# Patient Record
Sex: FEMALE | Race: WHITE | NOT HISPANIC OR LATINO | Employment: FULL TIME | URBAN - METROPOLITAN AREA
[De-identification: names, ages, dates, MRNs, and addresses within clinical notes are randomized per-mention and may not be internally consistent; named-entity substitution may affect disease eponyms.]

---

## 2017-08-24 ENCOUNTER — OFFICE VISIT (OUTPATIENT)
Dept: FAMILY MEDICINE | Facility: CLINIC | Age: 52
End: 2017-08-24
Payer: COMMERCIAL

## 2017-08-24 VITALS
WEIGHT: 259 LBS | DIASTOLIC BLOOD PRESSURE: 84 MMHG | HEIGHT: 66 IN | TEMPERATURE: 98.1 F | SYSTOLIC BLOOD PRESSURE: 134 MMHG | BODY MASS INDEX: 41.62 KG/M2 | HEART RATE: 76 BPM

## 2017-08-24 DIAGNOSIS — M46.1 INFLAMMATION OF RIGHT SACROILIAC JOINT (H): ICD-10-CM

## 2017-08-24 DIAGNOSIS — S76.311A RIGHT HAMSTRING MUSCLE STRAIN, INITIAL ENCOUNTER: Primary | ICD-10-CM

## 2017-08-24 PROCEDURE — 99213 OFFICE O/P EST LOW 20 MIN: CPT | Performed by: NURSE PRACTITIONER

## 2017-08-24 NOTE — MR AVS SNAPSHOT
After Visit Summary   8/24/2017    Lamar Zimmerman    MRN: [id number]           Patient Information     Date Of Birth          1965        Visit Information        Provider Department      8/24/2017 4:00 PM Mahogany Cedillo APRN [facility]        Today's Diagnoses     Right hamstring muscle strain, initial encounter    -  1    Inflammation of right sacroiliac joint (H)          Care Instructions    See ELLIS for exercises,      massage in Asper cream  2-4 times per day     Get the Advil gel caps and take  2-3  Capsules  3 times per day for the next  7-10 days.     Do the stretches that feel good.               Follow-ups after your visit        Additional Services     [facility] PT, HAND, AND CHIROPRACTIC REFERRAL       **This order will print in the [facility] Scheduling Office**    Physical Therapy, Hand Therapy and Chiropractic Care are available through:    *[facility]  *[facility]  *[facility]    Call one number to schedule at any of the above locations: [phone].    Your provider has referred you to: Physical Therapy at [facility] or [facility]    Indication/Reason for Referral: rigtht   Onset of Illness: several weeks ago    Therapy Orders: Evaluate and Treat  Special Programs: None  Special Request: None    Juan [facility]      Additional Comments for the Therapist or Chiropractor:     Please be aware that coverage of these services is subject to the terms and limitations of your health insurance plan.  Call member services at your health plan with any benefit or coverage questions.      Please bring the following to your appointment:    *Your personal calendar for scheduling future appointments  *Comfortable clothing                  Who to contact     Normal or non-critical lab and imaging results will be communicated to you by MyChart, letter or phone within 4 business days after the clinic has received the results. If you do  "not hear from us within 7 days, please contact the clinic through Koogame or phone. If you have a critical or abnormal lab result, we will notify you by phone as soon as possible.  Submit refill requests through Koogame or call your pharmacy and they will forward the refill request to us. Please allow 3 business days for your refill to be completed.          If you need to speak with a  for additional information , please call: [phone]           Additional Information About Your Visit        Koogame Information     Koogame gives you secure access to your electronic health record. If you see a primary care provider, you can also send messages to your care team and make appointments. If you have questions, please call your primary care clinic.  If you do not have a primary care provider, please call [phone] and they will assist you.        Care EveryWhere ID     This is your Care EveryWhere ID. This could be used by other organizations to access your [facility] medical records  [id number]        Your Vitals Were     Pulse Temperature Height BMI (Body Mass Index)          76 98.1  F (36.7  C) (Oral) 5' 5.5\" (1.664 m) 42.44 kg/m2         Blood Pressure from Last 3 Encounters:   08/24/17 134/84   01/26/16 128/76   12/28/15 134/72    Weight from Last 3 Encounters:   08/24/17 259 lb (117.5 kg)   01/26/16 244 lb (110.7 kg)   12/28/15 249 lb 12.8 oz (113.3 kg)              We Performed the Following     [facility] PT, HAND, AND CHIROPRACTIC REFERRAL          Today's Medication Changes          These changes are accurate as of: 8/24/17  4:55 PM.  If you have any questions, ask your nurse or doctor.               Stop taking these medicines if you haven't already. Please contact your care team if you have questions.     cefPROZIL 500 MG tablet   Commonly known as:  CEFZIL   Stopped by:  Mahogany Cedillo APRN CNP                    Primary Care Provider Office Phone # Fax #    Mahogany Cedillo, " APRN -[phone]       [address]   [place] [address]        Equal Access to Services     JEREMY PATEL : Hadii aad ku hadlawotf Caprilani, waivánda tamarajavon, nicole karodriguezda sheldon, jasmina fredyin hayaan arianabarbra magallon laKatieshannan aba. So [facility] [phone].    ATENCIÓN: Si habla español, tiene a field disposición servicios gratuitos de asistencia lingüística. Llame al [phone].    We comply with applicable federal civil rights laws and [place] laws. We do not discriminate on the basis of race, color, national origin, age, disability sex, sexual orientation or gender identity.            Thank you!     Thank you for choosing [facility] [place]  for your care. Our goal is always to provide you with excellent care. Hearing back from our patients is one way we can continue to improve our services. Please take a few minutes to complete the written survey that you may receive in the mail after your visit with us. Thank you!             Your Updated Medication List - Protect others around you: Learn how to safely use, store and throw away your medicines at www.disposemymeds.org.          This list is accurate as of: 8/24/17  4:55 PM.  Always use your most recent med list.                   Brand Name Dispense Instructions for use Diagnosis    NO ACTIVE MEDICATIONS      .

## 2017-08-24 NOTE — PATIENT INSTRUCTIONS
See ELLIS for exercises,      massage in Asper cream  2-4 times per day     Get the Advil gel caps and take  2-3  Capsules  3 times per day for the next  7-10 days.     Do the stretches that feel good.

## 2017-08-24 NOTE — NURSING NOTE
"Chief Complaint   Patient presents with     Musculoskeletal Problem       Initial /84 (BP Location: Right arm, Patient Position: Chair, Cuff Size: Adult Large)  Pulse 76  Temp 98.1  F (36.7  C) (Oral)  Ht 5' 5.5\" (1.664 m)  Wt 259 lb (117.5 kg)  BMI 42.44 kg/m2 Estimated body mass index is 42.44 kg/(m^2) as calculated from the following:    Height as of this encounter: 5' 5.5\" (1.664 m).    Weight as of this encounter: 259 lb (117.5 kg).  Medication Reconciliation: complete     Bronwyn Shaw CMA (AAMA)      "

## 2017-08-24 NOTE — PROGRESS NOTES
SUBJECTIVE:   Lamar Zimmreman is a 51 year old female who presents to clinic today for the following health issues:      Hip Pain    Onset: 2 months    Description:   Location: Right side  Character: Nagging, aching pain    Intensity: moderate    Progression of Symptoms: worse    Accompanying Signs & Symptoms:  Other symptoms: radiation of pain to lower right leg    History:   Previous similar pain: no       Precipitating factors:   Trauma or overuse: YES- states that it started after riding bike a lot at the gym and got worse after long car rides over the summer. Sitting for long periods really exacerbates the pain.    Alleviating factors:  Improved by: NSAID - ibuprofen    Therapies Tried and outcome: Ibuprofen - does help to alleviate the pain          Joined ad gym with a co-worker and they were going great .   Started in  January - going to the gym 3-5 times per week. They were riding the bike and then weights.  Then the bikes.   2 months ago started to have right  Hip pain . Hurts to sit.  Need to change position frequently   Sleeping on the right hip is OK .  And she is able to  Get up.   She will have tingling going down the right outer thigh to the calf.    Has been trying to keep exercising but has been adjusting her workout.   The pain starts  At the top of the thigh and lower buttock .    Stretching is feeling good.    Ibuprofen will help during the day but has to stand /walk most of the time       Problem list and histories reviewed & adjusted, as indicated.  Additional history: as documented    Patient Active Problem List   Diagnosis     Overweight     CARDIOVASCULAR SCREENING; LDL GOAL LESS THAN 160     Radha-menopause     Fatigue     Weight gain     24 hour contact handout given     Vitamin D deficiency     Contact dermatitis     GERD (gastroesophageal reflux disease)     Elevated blood pressure reading without diagnosis of hypertension     Malaise and fatigue     Past Surgical History:   Procedure  "Laterality Date     C APPENDECTOMY  3/1991     C  DELIVERY ONLY      , Low Cervical     SURGICAL HISTORY OF -       Laparoscopy-Ovarian Cyst       Social History   Substance Use Topics     Smoking status: Former Smoker     Smokeless tobacco: Never Used      Comment: quit      Alcohol use Yes      Comment: on occasion     Family History   Problem Relation Age of Onset     Hypertension Mother      DIABETES Maternal Grandmother      CEREBROVASCULAR DISEASE Paternal Grandmother      C.A.D. Paternal Grandfather      DIABETES Paternal Grandfather      Breast Cancer Other      cousin         Current Outpatient Prescriptions   Medication Sig Dispense Refill     NO ACTIVE MEDICATIONS .       Allergies   Allergen Reactions     Erythromycin Rash     Penicillin [Esters] Rash     Sulfa Drugs Rash     Voltaren [Nsaids] Rash     Nickel Rash     BP Readings from Last 3 Encounters:   17 134/84   16 128/76   12/28/15 134/72    Wt Readings from Last 3 Encounters:   17 259 lb (117.5 kg)   16 244 lb (110.7 kg)   12/28/15 249 lb 12.8 oz (113.3 kg)                        Reviewed and updated as needed this visit by clinical staff     Reviewed and updated as needed this visit by Provider         ROS:  C: NEGATIVE for fever, chills, POSITIVE change in weight with weight gain   E/M: NEGATIVE for ear, mouth and throat problems  R: NEGATIVE for significant cough or SOB  CV: NEGATIVE for chest pain, palpitations or peripheral edema  MUSCULOSKELETAL: POSITIVE  for right upper thigh /hip pain ,. And right low back pain     OBJECTIVE:     /84 (BP Location: Right arm, Patient Position: Chair, Cuff Size: Adult Large)  Pulse 76  Temp 98.1  F (36.7  C) (Oral)  Ht 5' 5.5\" (1.664 m)  Wt 259 lb (117.5 kg)  BMI 42.44 kg/m2  Body mass index is 42.44 kg/(m^2).   GENERAL: healthy, alert and no distress  RESP: lungs clear to auscultation - no rales, rhonchi or wheezes  CV: regular rate and " "rhythm, normal S1 S2, no S3 or S4, no murmur, click or rub, no peripheral edema and peripheral pulses strong  MS: Tender:  right SI joint,  Right upper hamstring   Non-tender:  lumbar spinous processes, lumbar facet joints, left para lumbar muscles, right para lumbar muscles, left SI joint  Range of Motion:  lumbar flexion  full, pain-free, lumbar extension  full, painful, left lateral lumbar bending  full, painful, right lateral lumbar bending  decreased, painful, left lateral lumbar rotation  full, painful, right lateral lumbar rotation  full, painful  Strength:  able to heel walk, able to toe walk, gastrocsoleus   5/5, hamstrings  5/5, quadriceps  5/5  Special tests:  positive SI joint compression, .  Is very tender over the upper right hamstring    Hip Exam: Hip ROM full, right greater trocanter non-tender, right hamstring       Diagnostic Test Results:  none     ASSESSMENT/PLAN:     ASSESSMENT/PLAN:      ICD-10-CM    1. Right hamstring muscle strain, initial encounter S76.311A [facility] PT, HAND, AND CHIROPRACTIC REFERRAL   2. Inflammation of right sacroiliac joint (H) M46.1 [facility] PT, HAND, AND CHIROPRACTIC REFERRAL       Patient Instructions   See ELLIS for exercises,      massage in Asper cream  2-4 times per day     Get the Advil gel caps and take  2-3  Capsules  3 times per day for the next  7-10 days.     Do the stretches that feel good.                  BMI:   Estimated body mass index is 42.44 kg/(m^2) as calculated from the following:    Height as of this encounter: 5' 5.5\" (1.664 m).    Weight as of this encounter: 259 lb (117.5 kg).   Weight management plan: Discussed healthy diet and exercise guidelines and patient will follow up in 6 months in clinic to re-evaluate.        CONSULTATION/REFERRAL to ELLIS   See Patient Instructions    CORINA LEHMAN NP, APRN [facility]  "

## 2017-08-30 ENCOUNTER — THERAPY VISIT (OUTPATIENT)
Dept: PHYSICAL THERAPY | Facility: CLINIC | Age: 52
End: 2017-08-30
Payer: COMMERCIAL

## 2017-08-30 DIAGNOSIS — M25.551 HIP PAIN, RIGHT: Primary | ICD-10-CM

## 2017-08-30 PROCEDURE — 97110 THERAPEUTIC EXERCISES: CPT | Mod: GP | Performed by: PHYSICAL THERAPIST

## 2017-08-30 PROCEDURE — 97161 PT EVAL LOW COMPLEX 20 MIN: CPT | Mod: GP | Performed by: PHYSICAL THERAPIST

## 2017-08-30 NOTE — PROGRESS NOTES
Subjective:    Patient is a 51 year old female presenting with rehab right hip hpi.   Lamar Zimmerman is a 51 year old female with a right hip condition.  Condition occurred with:  Repetition/overuse.  Condition occurred: at home.  This is a new condition  Lamar reports today with complaitns of R hamstring pain initially which has been getting worse over the past 3 months. She states taht sitting and driving is very difficult. She has pain that goes down to her calf. She takes ibuprofen which helps. She has a lot of pain in the buttock and the posterior part of her leg. She states that stairs are difficult. She reports that 5 mins of walking is very painful but sitting is the worst and most difficult to get comfortable. .    Patient reports pain:  Posterior and lateral.  Radiates to:  Hip.  Pain is described as aching and sharp and is constant and reported as 8/10 and 4/10.  Associated symptoms:  Loss of motion/stiffness and loss of strength. Pain is the same all the time.  Symptoms are exacerbated by standing, walking, sitting, weight bearing, ascending stairs, descending stairs and bending/squatting and relieved by rest (streching).  Since onset symptoms are unchanged.  Special testing: none.  Previous treatment: none.  Improvement with previous treatment: none.  General health as reported by patient is good.  Pertinent medical history includes:  None.  Medical allergies: yes.  Other surgeries include:  Other.  Current medications:  Anti-inflammatory.  Current occupation is   .  Patient is working in normal job without restrictions.  Primary job tasks include:  Prolonged sitting, repetitive tasks and prolonged standing.    Barriers include:  None as reported by the patient.    Red flags:  None as reported by the patient.                        Objective:  HIP:    PROM:   L  R   Flexion wnl wnl   Extension wnl wnl   Abduction wnl wnl   IR wnl wnl   ER wnl wnl painful     Strength:   L R   HIP      Flex 5/5 5/5   Ext 4/5 4-/5   Abd 4/5 4-/5 pain   KNEE     Flex 5/5 5/5   Ext 5/5 5/5     Special tests:   KALA Barr's wnl wnl   Luan wnl wnl   DAT CHRISTIAN         Palpation: very tender along R glute and piriformis muscle    Functional: pain with sitting driving and prolonged acitviity    Gait: wnl upon eval today. Patient reports that she does have increased pain with prolonged walking which occasionally causes her to limp.     Symptoms seem to be related to sciatic type issues due to significant tightness and irritation to piriformis and deeper glute muscles. DOes have some pain along R greater trochanter. Would benefit from STM to deeper glute muscles and glute strengthening.     System    Physical Exam    General     ROS    Assessment/Plan:      Patient is a 51 year old female with right side hip complaints.    Patient has the following significant findings with corresponding treatment plan.                Diagnosis 1:  R hip pain  Pain -  hot/cold therapy, US, manual therapy, self management, education, directional preference exercise and home program  Decreased ROM/flexibility - manual therapy, therapeutic exercise, therapeutic activity and home program  Decreased joint mobility - manual therapy, therapeutic exercise, therapeutic activity and home program  Decreased strength - therapeutic exercise, therapeutic activities and home program  Impaired gait - gait training and home program  Impaired muscle performance - neuro re-education and home program  Decreased function - therapeutic activities and home program    Therapy Evaluation Codes:   1) History comprised of:   Personal factors that impact the plan of care:      Time since onset of symptoms and Work status.    Comorbidity factors that impact the plan of care are:      Pain at night/rest.     Medications impacting care: Anti-inflammatory.  2) Examination of Body Systems comprised of:   Body structures and functions that impact the plan of care:       Hip and Knee.   Activity limitations that impact the plan of care are:      Bending, Driving, Lifting, Reading/Computer work, Running, Sitting, Squatting/kneeling, Stairs, Standing, Walking, Working and Sleeping.  3) Clinical presentation characteristics are:   Stable/Uncomplicated.  4) Decision-Making    Low complexity using standardized patient assessment instrument and/or measureable assessment of functional outcome.  Cumulative Therapy Evaluation is: Low complexity.    Previous and current functional limitations:  (See Goal Flow Sheet for this information)    Short term and Long term goals: (See Goal Flow Sheet for this information)     Communication ability:  Patient appears to be able to clearly communicate and understand verbal and written communication and follow directions correctly.  Treatment Explanation - The following has been discussed with the patient:   RX ordered/plan of care  Anticipated outcomes  Possible risks and side effects  This patient would benefit from PT intervention to resume normal activities.   Rehab potential is good.    Frequency:  1 X week, once daily  Duration:  for 8 weeks  Discharge Plan:  Achieve all LTG.  Independent in home treatment program.  Reach maximal therapeutic benefit.    Please refer to the daily flowsheet for treatment today, total treatment time and time spent performing 1:1 timed codes.

## 2017-09-13 ENCOUNTER — THERAPY VISIT (OUTPATIENT)
Dept: PHYSICAL THERAPY | Facility: CLINIC | Age: 52
End: 2017-09-13
Payer: COMMERCIAL

## 2017-09-13 DIAGNOSIS — M25.551 HIP PAIN, RIGHT: ICD-10-CM

## 2017-09-13 PROCEDURE — 97530 THERAPEUTIC ACTIVITIES: CPT | Mod: GP | Performed by: PHYSICAL THERAPIST

## 2017-09-13 PROCEDURE — 97110 THERAPEUTIC EXERCISES: CPT | Mod: GP | Performed by: PHYSICAL THERAPIST

## 2017-09-20 ENCOUNTER — THERAPY VISIT (OUTPATIENT)
Dept: PHYSICAL THERAPY | Facility: CLINIC | Age: 52
End: 2017-09-20
Payer: COMMERCIAL

## 2017-09-20 DIAGNOSIS — M25.551 HIP PAIN, RIGHT: ICD-10-CM

## 2017-09-20 PROCEDURE — 97110 THERAPEUTIC EXERCISES: CPT | Mod: GP | Performed by: PHYSICAL THERAPIST

## 2017-09-20 PROCEDURE — 97140 MANUAL THERAPY 1/> REGIONS: CPT | Mod: GP | Performed by: PHYSICAL THERAPIST

## 2017-10-03 ENCOUNTER — THERAPY VISIT (OUTPATIENT)
Dept: PHYSICAL THERAPY | Facility: CLINIC | Age: 52
End: 2017-10-03
Payer: COMMERCIAL

## 2017-10-03 DIAGNOSIS — M25.551 HIP PAIN, RIGHT: ICD-10-CM

## 2017-10-03 PROCEDURE — 97140 MANUAL THERAPY 1/> REGIONS: CPT | Mod: GP | Performed by: PHYSICAL THERAPIST

## 2017-10-03 PROCEDURE — 97110 THERAPEUTIC EXERCISES: CPT | Mod: GP | Performed by: PHYSICAL THERAPIST

## 2017-11-07 ENCOUNTER — THERAPY VISIT (OUTPATIENT)
Dept: PHYSICAL THERAPY | Facility: CLINIC | Age: 52
End: 2017-11-07
Payer: COMMERCIAL

## 2017-11-07 DIAGNOSIS — M25.551 HIP PAIN, RIGHT: ICD-10-CM

## 2017-11-07 PROCEDURE — 97110 THERAPEUTIC EXERCISES: CPT | Mod: GP | Performed by: PHYSICAL THERAPIST

## 2017-11-07 PROCEDURE — 97140 MANUAL THERAPY 1/> REGIONS: CPT | Mod: GP | Performed by: PHYSICAL THERAPIST

## 2017-12-05 ENCOUNTER — THERAPY VISIT (OUTPATIENT)
Dept: PHYSICAL THERAPY | Facility: CLINIC | Age: 52
End: 2017-12-05
Payer: COMMERCIAL

## 2017-12-05 DIAGNOSIS — M25.551 HIP PAIN, RIGHT: ICD-10-CM

## 2017-12-05 PROCEDURE — 97110 THERAPEUTIC EXERCISES: CPT | Mod: GP | Performed by: PHYSICAL THERAPIST

## 2017-12-05 PROCEDURE — 97140 MANUAL THERAPY 1/> REGIONS: CPT | Mod: GP | Performed by: PHYSICAL THERAPIST

## 2018-03-02 ENCOUNTER — TELEPHONE (OUTPATIENT)
Dept: FAMILY MEDICINE | Facility: CLINIC | Age: 53
End: 2018-03-02

## 2018-03-24 ENCOUNTER — HEALTH MAINTENANCE LETTER (OUTPATIENT)
Age: 53
End: 2018-03-24

## 2018-07-02 ENCOUNTER — HOSPITAL ENCOUNTER (OUTPATIENT)
Dept: MAMMOGRAPHY | Facility: CLINIC | Age: 53
Discharge: HOME | End: 2018-07-02
Attending: NURSE PRACTITIONER | Admitting: NURSE PRACTITIONER
Payer: COMMERCIAL

## 2018-07-02 DIAGNOSIS — Z12.31 VISIT FOR SCREENING MAMMOGRAM: ICD-10-CM

## 2018-07-02 PROCEDURE — 77067 SCR MAMMO BI INCL CAD: CPT

## 2018-10-04 ENCOUNTER — OFFICE VISIT (OUTPATIENT)
Dept: FAMILY MEDICINE | Facility: CLINIC | Age: 53
End: 2018-10-04
Payer: COMMERCIAL

## 2018-10-04 VITALS
HEIGHT: 66 IN | HEART RATE: 80 BPM | SYSTOLIC BLOOD PRESSURE: 130 MMHG | WEIGHT: 267 LBS | TEMPERATURE: 98.6 F | DIASTOLIC BLOOD PRESSURE: 85 MMHG | BODY MASS INDEX: 42.91 KG/M2

## 2018-10-04 DIAGNOSIS — Z86.59 HISTORY OF ANOREXIA NERVOSA: ICD-10-CM

## 2018-10-04 DIAGNOSIS — R53.83 FATIGUE, UNSPECIFIED TYPE: ICD-10-CM

## 2018-10-04 DIAGNOSIS — R63.5 WEIGHT GAIN: ICD-10-CM

## 2018-10-04 DIAGNOSIS — Z12.11 SPECIAL SCREENING FOR MALIGNANT NEOPLASMS, COLON: ICD-10-CM

## 2018-10-04 DIAGNOSIS — M25.551 HIP PAIN, RIGHT: ICD-10-CM

## 2018-10-04 DIAGNOSIS — Z86.59 HISTORY OF BULIMIA: ICD-10-CM

## 2018-10-04 DIAGNOSIS — L72.9 CYST OF SKIN: ICD-10-CM

## 2018-10-04 DIAGNOSIS — Z23 NEED FOR VACCINATION: ICD-10-CM

## 2018-10-04 DIAGNOSIS — Z00.00 WELL ADULT EXAM: Primary | ICD-10-CM

## 2018-10-04 DIAGNOSIS — E66.01 MORBID OBESITY (H): ICD-10-CM

## 2018-10-04 DIAGNOSIS — K21.00 GASTROESOPHAGEAL REFLUX DISEASE WITH ESOPHAGITIS: ICD-10-CM

## 2018-10-04 PROCEDURE — 90715 TDAP VACCINE 7 YRS/> IM: CPT | Performed by: NURSE PRACTITIONER

## 2018-10-04 PROCEDURE — 90471 IMMUNIZATION ADMIN: CPT | Performed by: NURSE PRACTITIONER

## 2018-10-04 PROCEDURE — 99396 PREV VISIT EST AGE 40-64: CPT | Mod: 25 | Performed by: NURSE PRACTITIONER

## 2018-10-04 PROCEDURE — 99214 OFFICE O/P EST MOD 30 MIN: CPT | Mod: 25 | Performed by: NURSE PRACTITIONER

## 2018-10-04 RX ORDER — CYCLOBENZAPRINE HCL 10 MG
10 TABLET ORAL 2 TIMES DAILY PRN
Qty: 320 TABLET | Refills: 1 | Status: SHIPPED | OUTPATIENT
Start: 2018-10-04 | End: 2021-09-15

## 2018-10-04 RX ORDER — DOXYCYCLINE 100 MG/1
100 CAPSULE ORAL 2 TIMES DAILY
Qty: 20 CAPSULE | Refills: 0 | Status: SHIPPED | OUTPATIENT
Start: 2018-10-04 | End: 2018-10-08

## 2018-10-04 ASSESSMENT — ENCOUNTER SYMPTOMS
NAUSEA: 0
RHINORRHEA: 0
SLEEP DISTURBANCE: 0
CONSTIPATION: 0
POLYPHAGIA: 0
FATIGUE: 0
DIARRHEA: 0
CHEST TIGHTNESS: 0
ABDOMINAL PAIN: 0
DIFFICULTY URINATING: 0
LIGHT-HEADEDNESS: 0
NERVOUS/ANXIOUS: 0
HEADACHES: 0
MYALGIAS: 0
FREQUENCY: 0
ROS GI COMMENTS: HEARTBURN
NUMBNESS: 0
ABDOMINAL DISTENTION: 0
ACTIVITY CHANGE: 0
DIZZINESS: 0
ARTHRALGIAS: 1
SORE THROAT: 0
WHEEZING: 0
DYSPHORIC MOOD: 0
VOMITING: 0
PALPITATIONS: 0
SHORTNESS OF BREATH: 0
UNEXPECTED WEIGHT CHANGE: 1
POLYDIPSIA: 0
COUGH: 0

## 2018-10-04 NOTE — MR AVS SNAPSHOT
After Visit Summary   10/4/2018    Lamar Zimmerman    MRN: [id number]           Patient Information     Date Of Birth          1965        Visit Information        Provider Department      10/4/2018 1:00 PM Miya Dunbar APRN [facility]        Today's Diagnoses     Morbid obesity (H)    -  1    Gastroesophageal reflux disease with esophagitis        Weight gain        Well adult exam        Special screening for malignant neoplasms, colon        Fatigue, unspecified type        Cyst of skin        Hip pain, right          Care Instructions    Start zantac 75 mg orally daily, may increase to 2 75 mg tabs in the AM if needed. Lowest effective dose possible.       Preventive Health Recommendations  Female Ages 50 - 64    Yearly exam: See your health care provider every year in order to  o Review health changes.   o Discuss preventive care.    o Review your medicines if your doctor has prescribed any.      Get a Pap test every three years (unless you have an abnormal result and your provider advises testing more often).    If you get Pap tests with HPV test, you only need to test every 5 years, unless you have an abnormal result.     You do not need a Pap test if your uterus was removed (hysterectomy) and you have not had cancer.    You should be tested each year for STDs (sexually transmitted diseases) if you're at risk.     Have a mammogram every 1 to 2 years.    Have a colonoscopy at age 50, or have a yearly FIT test (stool test). These exams screen for colon cancer.      Have a cholesterol test every 5 years, or more often if advised.    Have a diabetes test (fasting glucose) every three years. If you are at risk for diabetes, you should have this test more often.     If you are at risk for osteoporosis (brittle bone disease), think about having a bone density scan (DEXA).    Shots: Get a flu shot each year. Get a tetanus shot every 10 years.    Nutrition:     Eat at  least 5 servings of fruits and vegetables each day.    Eat whole-grain bread, whole-wheat pasta and brown rice instead of white grains and rice.    Get adequate Calcium and Vitamin D.     Lifestyle    Exercise at least 150 minutes a week (30 minutes a day, 5 days a week). This will help you control your weight and prevent disease.    Limit alcohol to one drink per day.    No smoking.     Wear sunscreen to prevent skin cancer.     See your dentist every six months for an exam and cleaning.    See your eye doctor every 1 to 2 years.            Follow-ups after your visit        Additional Services     GASTROENTEROLOGY ADULT REF PROCEDURE ONLY       Last Lab Result: Creatinine (mg/dL)       Date                     Value                 01/19/2015               0.69             ----------  Body mass index is 43.76 kg/(m^2).     Needed:  No  Language:  English    Patient will be contacted to schedule procedure.     Please be aware that coverage of these services is subject to the terms and limitations of your health insurance plan.  Call member services at your health plan with any benefit or coverage questions.  Any procedures must be performed at a [facility] facility OR coordinated by your clinic's referral office.    Please bring the following with you to your appointment:    (1) Any X-Rays, CTs or MRIs which have been performed.  Contact the facility where they were done to arrange for  prior to your scheduled appointment.    (2) List of current medications   (3) This referral request   (4) Any documents/labs given to you for this referral            [facility] PT, HAND, AND CHIROPRACTIC REFERRAL       Physical Therapy, Hand Therapy and Chiropractic Care are available through:  *[facility]  *Hand Therapy (Occupational Therapy or Physical Therapy)  *[facility]    Call one number to schedule at any of the above locations: [phone].    Physical therapy, Hand  therapy and/or Chiropractic care has been recommended by your physician as an excellent treatment option to reduce pain and help people return to normal activities, including sports.  Therapy and/or chiropractic care services are a great complement or alternative to expensive and invasive surgery, injections, or long-term use of prescription medications. The primary goal is to identify the underlying problem and provide you the tools to manage your condition on your own.     Please be aware that coverage of these services is subject to the terms and limitations of your health insurance plan.  Call member services at your health plan with any benefit or coverage questions.      Please bring the following to your appointment:  *Your personal calendar for scheduling future appointments  *Comfortable clothing                  Follow-up notes from your care team     Return for A Fasting Lab Only Visit.      Future tests that were ordered for you today     Open Future Orders        Priority Expected Expires Ordered    [facility] PT, HAND, AND CHIROPRACTIC REFERRAL Routine  10/4/2019 10/4/2018    CBC with platelets differential Routine  10/4/2019 10/4/2018    Comprehensive metabolic panel Routine  10/4/2019 10/4/2018    Lipid panel reflex to direct LDL Fasting Routine  10/4/2019 10/4/2018    TSH with free T4 reflex Routine  10/4/2019 10/4/2018    Vitamin D Deficiency Routine  10/4/2019 10/4/2018            Who to contact     Normal or non-critical lab and imaging results will be communicated to you by Hubkickhart, letter or phone within 4 business days after the clinic has received the results. If you do not hear from us within 7 days, please contact the clinic through Hubkickhart or phone. If you have a critical or abnormal lab result, we will notify you by phone as soon as possible.  Submit refill requests through [employer] or call your pharmacy and they will forward the refill request to us. Please allow 3 business days for your refill  "to be completed.          If you need to speak with a  for additional information , please call: [phone]           Additional Information About Your Visit        NearbyNowhart Information     [email or web address] gives you secure access to your electronic health record. If you see a primary care provider, you can also send messages to your care team and make appointments. If you have questions, please call your primary care clinic.  If you do not have a primary care provider, please call [phone] and they will assist you.        Care EveryWhere ID     This is your Care EveryWhere ID. This could be used by other organizations to access your [facility] medical records  [id number]        Your Vitals Were     Pulse Temperature Height BMI (Body Mass Index)          80 98.6  F (37  C) (Tympanic) 5' 5.5\" (1.664 m) 43.76 kg/m2         Blood Pressure from Last 3 Encounters:   10/04/18 130/85   08/24/17 134/84   01/26/16 128/76    Weight from Last 3 Encounters:   10/04/18 267 lb (121.1 kg)   08/24/17 259 lb (117.5 kg)   01/26/16 244 lb (110.7 kg)              We Performed the Following     GASTROENTEROLOGY ADULT REF PROCEDURE ONLY     HIV Antigen Antibody Combo          Today's Medication Changes          These changes are accurate as of 10/4/18  1:54 PM.  If you have any questions, ask your nurse or doctor.               Start taking these medicines.        Dose/Directions    cyclobenzaprine 10 MG tablet   Commonly known as:  FLEXERIL   Used for:  Hip pain, right        Dose:  10 mg   Take 1 tablet (10 mg) by mouth 2 times daily as needed for muscle spasms No driving after taking medication for 6 hours.   Quantity:  320 tablet   Refills:  1       doxycycline 100 MG capsule   Commonly known as:  VIBRAMYCIN   Used for:  Cyst of skin        Dose:  100 mg   Take 1 capsule (100 mg) by mouth 2 times daily   Quantity:  20 capsule   Refills:  0            Where to get your medicines      These medications were sent to " [facility] [facility] [address]  [address], [facility] [address]     Phone:  [phone]     cyclobenzaprine 10 MG tablet    doxycycline 100 MG capsule                Primary Care Provider Office Phone # Fax #    [place] Jj Ole Cedillo, APRN [facility] [phone] [address]   [place] [address]        Equal Access to Services     JEREMY PATEL : Hadii aad ku hadasho Soomaali, waaxda luqadaha, qaybta kaalmada adeegyada, waxay idiin hayaan adeeg kharash lawilmer . So [facility] [phone].    ATENCIÓN: Si habla español, tiene a field disposición servicios gratuitos de asistencia lingüística. Demetrisame al [phone].    We comply with applicable federal civil rights laws and [place] laws. We do not discriminate on the basis of race, color, national origin, age, disability, sex, sexual orientation, or gender identity.            Thank you!     Thank you for choosing [facility]  for your care. Our goal is always to provide you with excellent care. Hearing back from our patients is one way we can continue to improve our services. Please take a few minutes to complete the written survey that you may receive in the mail after your visit with us. Thank you!             Your Updated Medication List - Protect others around you: Learn how to safely use, store and throw away your medicines at www.disposemymeds.org.          This list is accurate as of 10/4/18  1:54 PM.  Always use your most recent med list.                   Brand Name Dispense Instructions for use Diagnosis    cyclobenzaprine 10 MG tablet    FLEXERIL    320 tablet    Take 1 tablet (10 mg) by mouth 2 times daily as needed for muscle spasms No driving after taking medication for 6 hours.    Hip pain, right       doxycycline 100 MG capsule    VIBRAMYCIN    20 capsule    Take 1 capsule (100 mg) by mouth 2 times daily    Cyst of skin       NO ACTIVE MEDICATIONS      .

## 2018-10-04 NOTE — PROGRESS NOTES
SUBJECTIVE:   CC: Lamar Zimmerman is an 53 year old woman who presents for preventive health visit.     Healthy Habits:    Do you get at least three servings of calcium containing foods daily (dairy, green leafy vegetables, etc.)? yes    Amount of exercise or daily activities, outside of work: none    Problems taking medications regularly not applicable    Medication side effects: No    Have you had an eye exam in the past two years? yes    Do you see a dentist twice per year? yes    Do you have sleep apnea, excessive snoring or daytime drowsiness?no      Concerned with weight    Today's PHQ-2 Score:   PHQ-2 ( 1999 Pfizer) 10/4/2018 1/26/2016   Q1: Little interest or pleasure in doing things 0 0   Q2: Feeling down, depressed or hopeless 0 0   PHQ-2 Score 0 0       Abuse: Current or Past(Physical, Sexual or Emotional)- No  Do you feel safe in your environment - Yes    Social History   Substance Use Topics     Smoking status: Former Smoker     Smokeless tobacco: Never Used      Comment: quit 1993     Alcohol use Yes      Comment: on occasion     If you drink alcohol do you typically have >3 drinks per day or >7 drinks per week? No                     Reviewed orders with patient.  Reviewed health maintenance and updated orders accordingly -  Current Outpatient Prescriptions   Medication Sig Dispense Refill     cyclobenzaprine (FLEXERIL) 10 MG tablet Take 1 tablet (10 mg) by mouth 2 times daily as needed for muscle spasms No driving after taking medication for 6 hours. 320 tablet 1     doxycycline (VIBRAMYCIN) 100 MG capsule Take 1 capsule (100 mg) by mouth 2 times daily 20 capsule 0     NO ACTIVE MEDICATIONS .       Allergies   Allergen Reactions     Erythromycin Rash     Penicillin [Penicillins] Rash     Sulfa Drugs Rash     Voltaren [Nsaids] Rash     Nickel Rash       Patient over age 50, mutual decision to screen reflected in health maintenance.    Pertinent mammograms are reviewed under the imaging tab.  History  of abnormal Pap smear: NO - age 30-65 PAP every 5 years with negative HPV co-testing recommended  PAP / HPV 1/19/2015 5/1/2012 9/29/2009   PAP NIL NIL NIL     Reviewed and updated as needed this visit by clinical staff  Allergies         Reviewed and updated as needed this visit by Provider            Here today for physical. Father passed away 1 week ago from esophageal cancer.     Continues to have right hip pain. Will have pain all the way down right side into right leg. Pain would move from side to side. Is not able to do any type of repetitive type motion because that will causes pain. Is not able to bike, walk because repeatitive motion hurts it. Has never had a MRI in the past. Pain will be down outside of leg.      Has been gaining weight. Tries to go to the gym three times per week. Is not able to as much because of above. Has struggled with anorexia and bulmia in the past when was in HS. Is concerned that if does food journal would re-trigger paranoid behavior. Would like to lose 50 pounds in 1 year. Feels very tired and labio is down     Has acid reflux daily. Takes zantac after starts to feel the pain. Does drink coffee, and alcohol.  Father with esophageal cancer.  Knows that weight is contributing to reflux.  Knows that diet could be better.    Has area to left armpit.  Reports was a zit in the past and popped it.  Then had some scar tissue left over.  Gradually over the last few weeks has started to become more painful and tender.  Is occasionally draining.  No home over-the-counter treatments.      Last Pap: 1/15 normal, HPV negative. Never an abnormal   Last mammogram: 7/18  Last BMD: Never   Last Colonoscopy: Never   Last eye exam: Yes  Last dental exam: Every 6 months   Last tetanus vaccine: 5/05  Last influenza vaccine: Does not get   Last shingles vaccine: Never   Last pneumonia vaccine: Never   Hep C screen (born [birth year]): Never   HIV screen: Never   AAA screen (age 65-78 with smoking hx):  "N/A  IVD (HTN, Hyperlipid, Smoking): N/A  Lung CA screening (55-80, 30 pk smoking hx): N/A      ROS:  Review of Systems   Constitutional: Positive for unexpected weight change (gain). Negative for activity change and fatigue.   HENT: Negative for ear pain, rhinorrhea and sore throat.    Eyes: Negative for visual disturbance.   Respiratory: Negative for cough, chest tightness, shortness of breath and wheezing.    Cardiovascular: Negative for chest pain, palpitations and leg swelling.   Gastrointestinal: Negative for abdominal distention, abdominal pain, constipation, diarrhea, nausea and vomiting.        Heartburn     Endocrine: Negative for cold intolerance, heat intolerance, polydipsia, polyphagia and polyuria.   Genitourinary: Negative for difficulty urinating, enuresis, frequency and urgency.   Musculoskeletal: Positive for arthralgias (right hip). Negative for myalgias.   Skin: Negative for rash.        Bump under left armpit     Neurological: Negative for dizziness, light-headedness, numbness and headaches.   Psychiatric/Behavioral: Negative for dysphoric mood and sleep disturbance. The patient is not nervous/anxious.        OBJECTIVE:   /85  Pulse 80  Temp 98.6  F (37  C) (Tympanic)  Ht 5' 5.5\" (1.664 m)  Wt 267 lb (121.1 kg)  BMI 43.76 kg/m2  EXAM:  Physical Exam   Constitutional: She appears well-developed and well-nourished.   HENT:   Head: Normocephalic and atraumatic.   Right Ear: Tympanic membrane and external ear normal. No middle ear effusion.   Left Ear: Tympanic membrane and external ear normal.  No middle ear effusion.   Nose: No mucosal edema.   Mouth/Throat: Uvula is midline, oropharynx is clear and moist and mucous membranes are normal.   Eyes: Pupils are equal, round, and reactive to light.   Neck: Normal range of motion. Carotid bruit is not present. No thyromegaly present.   Cardiovascular: Normal rate, regular rhythm and normal heart sounds.    Pulses:       Femoral pulses are 2+ " on the right side, and 2+ on the left side.  Pulmonary/Chest: Effort normal and breath sounds normal.   Abdominal: Soft. Normal appearance and bowel sounds are normal. There is no tenderness.   Musculoskeletal:        Right hip: She exhibits decreased range of motion. She exhibits normal strength, no tenderness and no bony tenderness.   Neurological: She is alert.   Skin: Skin is warm and dry. No rash noted.   Psychiatric: She has a normal mood and affect. Her behavior is normal.       ASSESSMENT/PLAN:   1. Morbid obesity (H)  Already has a plan in place to decrease weight.  Support given.    2. Gastroesophageal reflux disease with esophagitis  Discussed how to diminish symptoms. Patient understands that it may take 1-2 weeks to experience an improvement in symptoms  --Enc to stop smoking  --Stop alcohol or at least drink no more than one drink per day  --Avoid eating large meals, do not eat late at night   --Avoid foods that trigger   --Elevate head of bed 2-3 inches  --Eat frequently  Start taking over-the-counter Zantac 75 mg daily.  May increase to 150 mg daily if symptoms persist.    3. Weight gain  Be active  Eat a well balanced diet with fresh fruits and vegetables  Drink plenty of water 6-8 8 oz glasses of water per day  Be aware of portion sizes  Enc to look at food labels  Avoid pop   Do not focus so much on amount of weight to be lost rather than changing lifestyle.    4. Well adult exam  Screening guidelines reviewed.   - CBC with platelets differential; Future  - Comprehensive metabolic panel; Future  - Lipid panel reflex to direct LDL Fasting; Future  - TSH with free T4 reflex; Future  - HIV Antigen Antibody Combo; Future    5. Special screening for malignant neoplasms, colon  Order placed, plans to call per self and set up  - GASTROENTEROLOGY ADULT REF PROCEDURE ONLY    6. Fatigue, unspecified type  Check labs today  - TSH with free T4 reflex; Future  - Vitamin D Deficiency; Future    7. Cyst of  "skin  Educated on use of medications  Encouraged to use warm moist pack.  Notify if no improvement.  - doxycycline (VIBRAMYCIN) 100 MG capsule; Take 1 capsule (100 mg) by mouth 2 times daily  Dispense: 20 capsule; Refill: 0    8. Hip pain, right  Urged to restart physical therapy.  Recommend low impact activities like swimming, yoga  - [facility], HAND, AND CHIROPRACTIC REFERRAL; Future  - cyclobenzaprine (FLEXERIL) 10 MG tablet; Take 1 tablet (10 mg) by mouth 2 times daily as needed for muscle spasms No driving after taking medication for 6 hours.  Dispense: 320 tablet; Refill: 1    9. Need for vaccination  Administer in clinic today  - TDAP VACCINE (ADACEL) [37025.002]  - 1st  Administration  [81172]    10. History of anorexia nervosa  Concern for trying to lose weight.  May trigger paranoid behavior.  We will continue to monitor.    11. History of bulimia  Concern for trying to lose weight.  May trigger paranoid behavior.  We will continue to monitor.      COUNSELING:   Reviewed preventive health counseling, as reflected in patient instructions       Regular exercise       Healthy diet/nutrition       Immunizations    Vaccinated for: TDAP             Colon cancer screening       HIV screeninx in teen years, 1x in adult years, and at intervals if high risk    BP Readings from Last 1 Encounters:   10/04/18 130/85     Estimated body mass index is 43.76 kg/(m^2) as calculated from the following:    Height as of this encounter: 5' 5.5\" (1.664 m).    Weight as of this encounter: 267 lb (121.1 kg).    BP Screening:   Last 3 BP Readings:    BP Readings from Last 3 Encounters:   10/04/18 130/85   17 134/84   16 128/76       The following was recommended to the patient:  Re-screen BP within a year and recommended lifestyle modifications  Weight management plan: Discussed healthy diet and exercise guidelines and patient will follow up in 12 months in clinic to re-evaluate.     reports that she has quit smoking. " She has never used smokeless tobacco.      Counseling Resources:  ATP IV Guidelines  Pooled Cohorts Equation Calculator  Breast Cancer Risk Calculator  FRAX Risk Assessment  ICSI Preventive Guidelines  Dietary Guidelines for Americans, 2010  USDA's MyPlate  ASA Prophylaxis  Lung CA Screening    TOY [facility]

## 2018-10-04 NOTE — PATIENT INSTRUCTIONS
Start zantac 75 mg orally daily, may increase to 2 75 mg tabs in the AM if needed. Lowest effective dose possible.       Preventive Health Recommendations  Female Ages 50 - 64    Yearly exam: See your health care provider every year in order to  o Review health changes.   o Discuss preventive care.    o Review your medicines if your doctor has prescribed any.      Get a Pap test every three years (unless you have an abnormal result and your provider advises testing more often).    If you get Pap tests with HPV test, you only need to test every 5 years, unless you have an abnormal result.     You do not need a Pap test if your uterus was removed (hysterectomy) and you have not had cancer.    You should be tested each year for STDs (sexually transmitted diseases) if you're at risk.     Have a mammogram every 1 to 2 years.    Have a colonoscopy at age 50, or have a yearly FIT test (stool test). These exams screen for colon cancer.      Have a cholesterol test every 5 years, or more often if advised.    Have a diabetes test (fasting glucose) every three years. If you are at risk for diabetes, you should have this test more often.     If you are at risk for osteoporosis (brittle bone disease), think about having a bone density scan (DEXA).    Shots: Get a flu shot each year. Get a tetanus shot every 10 years.    Nutrition:     Eat at least 5 servings of fruits and vegetables each day.    Eat whole-grain bread, whole-wheat pasta and brown rice instead of white grains and rice.    Get adequate Calcium and Vitamin D.     Lifestyle    Exercise at least 150 minutes a week (30 minutes a day, 5 days a week). This will help you control your weight and prevent disease.    Limit alcohol to one drink per day.    No smoking.     Wear sunscreen to prevent skin cancer.     See your dentist every six months for an exam and cleaning.    See your eye doctor every 1 to 2 years.

## 2018-10-05 PROBLEM — Z86.59 HISTORY OF BULIMIA: Status: ACTIVE | Noted: 2018-10-05

## 2018-10-05 PROBLEM — Z86.59 HISTORY OF ANOREXIA NERVOSA: Status: ACTIVE | Noted: 2018-10-05

## 2018-10-08 DIAGNOSIS — L72.9 CYST OF SKIN: Primary | ICD-10-CM

## 2018-10-08 RX ORDER — CEFPROZIL 500 MG/1
500 TABLET, FILM COATED ORAL 2 TIMES DAILY
Qty: 20 TABLET | Refills: 0 | Status: SHIPPED | OUTPATIENT
Start: 2018-10-08 | End: 2019-07-18

## 2018-10-10 DIAGNOSIS — Z00.00 WELL ADULT EXAM: ICD-10-CM

## 2018-10-10 DIAGNOSIS — R53.83 FATIGUE, UNSPECIFIED TYPE: ICD-10-CM

## 2018-10-10 LAB
ALBUMIN SERPL-MCNC: 3.5 G/DL (ref 3.4–5)
ALP SERPL-CCNC: 46 U/L (ref 40–150)
ALT SERPL W P-5'-P-CCNC: 30 U/L (ref 0–50)
ANION GAP SERPL CALCULATED.3IONS-SCNC: 6 MMOL/L (ref 3–14)
AST SERPL W P-5'-P-CCNC: 23 U/L (ref 0–45)
BASOPHILS # BLD AUTO: 0 10E9/L (ref 0–0.2)
BASOPHILS NFR BLD AUTO: 0.4 %
BILIRUB SERPL-MCNC: 0.5 MG/DL (ref 0.2–1.3)
BUN SERPL-MCNC: 16 MG/DL (ref 7–30)
CALCIUM SERPL-MCNC: 8.8 MG/DL (ref 8.5–10.1)
CHLORIDE SERPL-SCNC: 104 MMOL/L (ref 94–109)
CHOLEST SERPL-MCNC: 245 MG/DL
CO2 SERPL-SCNC: 27 MMOL/L (ref 20–32)
CREAT SERPL-MCNC: 0.74 MG/DL (ref 0.52–1.04)
DIFFERENTIAL METHOD BLD: NORMAL
EOSINOPHIL # BLD AUTO: 0.2 10E9/L (ref 0–0.7)
EOSINOPHIL NFR BLD AUTO: 4.7 %
ERYTHROCYTE [DISTWIDTH] IN BLOOD BY AUTOMATED COUNT: 12.9 % (ref 10–15)
GFR SERPL CREATININE-BSD FRML MDRD: 83 ML/MIN/1.7M2
GLUCOSE SERPL-MCNC: 101 MG/DL (ref 70–99)
HCT VFR BLD AUTO: 43.1 % (ref 35–47)
HDLC SERPL-MCNC: 59 MG/DL
HGB BLD-MCNC: 14.2 G/DL (ref 11.7–15.7)
LDLC SERPL CALC-MCNC: 164 MG/DL
LYMPHOCYTES # BLD AUTO: 1.9 10E9/L (ref 0.8–5.3)
LYMPHOCYTES NFR BLD AUTO: 39.5 %
MCH RBC QN AUTO: 32.1 PG (ref 26.5–33)
MCHC RBC AUTO-ENTMCNC: 32.9 G/DL (ref 31.5–36.5)
MCV RBC AUTO: 98 FL (ref 78–100)
MONOCYTES # BLD AUTO: 0.5 10E9/L (ref 0–1.3)
MONOCYTES NFR BLD AUTO: 9.8 %
NEUTROPHILS # BLD AUTO: 2.2 10E9/L (ref 1.6–8.3)
NEUTROPHILS NFR BLD AUTO: 45.6 %
NONHDLC SERPL-MCNC: 186 MG/DL
PLATELET # BLD AUTO: 261 10E9/L (ref 150–450)
POTASSIUM SERPL-SCNC: 3.9 MMOL/L (ref 3.4–5.3)
PROT SERPL-MCNC: 7 G/DL (ref 6.8–8.8)
RBC # BLD AUTO: 4.42 10E12/L (ref 3.8–5.2)
SODIUM SERPL-SCNC: 137 MMOL/L (ref 133–144)
TRIGL SERPL-MCNC: 108 MG/DL
TSH SERPL DL<=0.005 MIU/L-ACNC: 3.03 MU/L (ref 0.4–4)
WBC # BLD AUTO: 4.9 10E9/L (ref 4–11)

## 2018-10-10 PROCEDURE — 84443 ASSAY THYROID STIM HORMONE: CPT | Performed by: NURSE PRACTITIONER

## 2018-10-10 PROCEDURE — 80061 LIPID PANEL: CPT | Performed by: NURSE PRACTITIONER

## 2018-10-10 PROCEDURE — 80053 COMPREHEN METABOLIC PANEL: CPT | Performed by: NURSE PRACTITIONER

## 2018-10-10 PROCEDURE — 87389 HIV-1 AG W/HIV-1&-2 AB AG IA: CPT | Performed by: NURSE PRACTITIONER

## 2018-10-10 PROCEDURE — 36415 COLL VENOUS BLD VENIPUNCTURE: CPT | Performed by: NURSE PRACTITIONER

## 2018-10-10 PROCEDURE — 82306 VITAMIN D 25 HYDROXY: CPT | Performed by: NURSE PRACTITIONER

## 2018-10-10 PROCEDURE — 85025 COMPLETE CBC W/AUTO DIFF WBC: CPT | Performed by: NURSE PRACTITIONER

## 2018-10-11 ENCOUNTER — THERAPY VISIT (OUTPATIENT)
Dept: PHYSICAL THERAPY | Facility: CLINIC | Age: 53
End: 2018-10-11
Payer: COMMERCIAL

## 2018-10-11 DIAGNOSIS — M25.551 HIP PAIN, RIGHT: ICD-10-CM

## 2018-10-11 LAB
DEPRECATED CALCIDIOL+CALCIFEROL SERPL-MC: 36 UG/L (ref 20–75)
HIV 1+2 AB+HIV1 P24 AG SERPL QL IA: NONREACTIVE

## 2018-10-11 PROCEDURE — 97161 PT EVAL LOW COMPLEX 20 MIN: CPT | Mod: GP | Performed by: PHYSICAL THERAPIST

## 2018-10-11 PROCEDURE — 97110 THERAPEUTIC EXERCISES: CPT | Mod: GP | Performed by: PHYSICAL THERAPIST

## 2018-10-11 ASSESSMENT — ACTIVITIES OF DAILY LIVING (ADL)
ROLLING_OVER_IN_BED: NO DIFFICULTY AT ALL
WALKING_15_MINUTES_OR_GREATER: MODERATE DIFFICULTY
WALKING_APPROXIMATELY_10_MINUTES: SLIGHT DIFFICULTY
GETTING_INTO_AND_OUT_OF_A_BATHTUB: NO DIFFICULTY AT ALL
HEAVY_WORK: SLIGHT DIFFICULTY
HOS_ADL_SCORE(%): 85.29
PUTTING_ON_SOCKS_AND_SHOES: SLIGHT DIFFICULTY
WALKING_DOWN_STEEP_HILLS: NO DIFFICULTY AT ALL
STANDING_FOR_15_MINUTES: SLIGHT DIFFICULTY
TWISTING/PIVOTING_ON_INVOLVED_LEG: NO DIFFICULTY AT ALL
LIGHT_TO_MODERATE_WORK: SLIGHT DIFFICULTY
STEPPING_UP_AND_DOWN_CURBS: NO DIFFICULTY AT ALL
GOING_DOWN_1_FLIGHT_OF_STAIRS: SLIGHT DIFFICULTY
RECREATIONAL_ACTIVITIES: SLIGHT DIFFICULTY
GOING_UP_1_FLIGHT_OF_STAIRS: SLIGHT DIFFICULTY
SITTING_FOR_15_MINUTES: SLIGHT DIFFICULTY
HOS_ADL_COUNT: 17
HOS_ADL_ITEM_SCORE_TOTAL: 58
WALKING_INITIALLY: NO DIFFICULTY AT ALL
HOS_ADL_HIGHEST_POTENTIAL_SCORE: 68
DEEP_SQUATTING: SLIGHT DIFFICULTY
GETTING_INTO_AND_OUT_OF_AN_AVERAGE_CAR: NO DIFFICULTY AT ALL
WALKING_UP_STEEP_HILLS: NO DIFFICULTY AT ALL

## 2018-10-11 NOTE — PROGRESS NOTES
[facility] Initial Evaluation  Subjective:  HPI Comments: Pt complains of R LBP and R buttock pain and radiating symptoms down R LE symptoms into lateral hip, calf and into toes. This is a recurrent issue for the past 2+ years. Has put on some weight in the time being that hasn't helped her condition. Describes pain as a dull ache and numbness/tingling. Activities that exacerbate symptoms including walking (45 min to break), sitting (pain right away at 3/10), laying on R side/rolling over in bed, pain in PM 5/10. Laying supine, ice, meds, rest help aleviate the symptoms. Pt works as a , getting up and down from chair/floor is most painful activity at work. PMH is non remarkable.     Patient is a 53 year old female presenting with rehab left hip hpi. The history is provided by the patient.   Lamar Zimmerman is a 53 year old female with a right hip condition.  Condition occurred with:  Repetition/overuse.  Condition occurred: in the community, at work and at home.  This is a recurrent condition     Site of Pain: R buttock and R LB.  Radiates to:  Gluteals, hip, knee and lower leg (to foot).   and is intermittent and reported as 3/10.            Previous treatment includes chiropractic.  There was no improvement following previous treatment.  General health as reported by patient is good.  Pertinent medical history includes:  Overweight.  Medical allergies: no.  Surgical history:  section.  Medication history: Penacillan, sulfa, erthyrmyocin.  Current occupation is .                                    Objective:  System         Lumbar/SI Evaluation  ROM:    AROM Lumbar:   Flexion:          To shoe laces, no pain  Ext:                    100% ROM, no pain   Side Bend:        Left:  To knee, tightness in R LB    Right:  To knee , no pain  Rotation:           Left:     Right:   Side Glide:        Left:     Right:           Lumbar Myotomes:   normal                Lumbar Dermtomes:  normal (Can get diminished sensations in L3-4 distribution)                Neural Tension/Mobility:  Neural tension wnl lumbar: Slump positinve hip R hip elevated.      Right side:   Slump positive.  Right side:   SLR w/DF; Femoral Nerve or SLR  negative.   Lumbar Palpation:  Palpation (lumbar): No tenderness in low back, mild tone in paraspinals.        Lumbar Provocation:      Left negative with:  Mobility and PROM hip    Right negative with:  Mobility and PROM hip  Spinal Segmental Conclusions: Hypomobile vertebral movements with PA glides                                              Hip Evaluation      Hip Special Testing:   Special tests hip not assessed: Negative hip scower.     Right hip positive for the following special tests:  PiriformisRight hip negative for the following special tests:  Carlitos; Fadir/Labrum or SLR    Hip Palpation:  Palpations normal left hip: Notable discomfort on R piriformis region.    Right hip tenderness present at:  Piriformis  Right hip tenderness not present at:  Ischial Tuberosity; Greater Trachanter; IT Band; hip flexors; PSIS; Gluteus Medius or Bursa                 General Evaluation:      Gross Strength:  Gross strength wnl general: 4/5 hip flexion, 5/5 hip abduction, adduction, knee extension, knee flexion.                                                                           ROS    Assessment/Plan:    Patient is a 53 year old female with right side hip complaints.    Patient has the following significant findings with corresponding treatment plan.                Diagnosis 1:  R hip pain  Pain -  hot/cold therapy, US, electric stimulation, mechanical traction, manual therapy, splint/taping/bracing/orthotics, self management, education, directional preference exercise and home program  Decreased ROM/flexibility - manual therapy, therapeutic exercise and home program  Decreased joint mobility - manual therapy, therapeutic exercise  and home program  Decreased strength - therapeutic exercise, therapeutic activities and home program  Impaired gait - gait training and home program    Therapy Evaluation Codes:   1) History comprised of:   Personal factors that impact the plan of care:      None.    Comorbidity factors that impact the plan of care are:      Overweight.     Medications impacting care: Penecillan, sulfa, erythromyicin.  2) Examination of Body Systems comprised of:   Body structures and functions that impact the plan of care:      Hip.   Activity limitations that impact the plan of care are:      Bending, Sitting, Squatting/kneeling, Standing, Walking and Laying down.  3) Clinical presentation characteristics are:   Stable/Uncomplicated.  4) Decision-Making    Low complexity using standardized patient assessment instrument and/or measureable assessment of functional outcome.  Cumulative Therapy Evaluation is: Physical therapy re-evaluation.    Previous and current functional limitations:  (See Goal Flow Sheet for this information)    Short term and Long term goals: (See Goal Flow Sheet for this information)     Communication ability:  Patient appears to be able to clearly communicate and understand verbal and written communication and follow directions correctly.  Treatment Explanation - The following has been discussed with the patient:   RX ordered/plan of care  Anticipated outcomes  Possible risks and side effects  This patient would benefit from PT intervention to resume normal activities.   Rehab potential is good.    Frequency:  1 X week, once daily  Duration:  for 6-8 weeks  Discharge Plan:  Achieve all LTG.  Independent in home treatment program.  Reach maximal therapeutic benefit.    Please refer to the daily flowsheet for treatment today, total treatment time and time spent performing 1:1 timed codes.

## 2018-10-18 ENCOUNTER — THERAPY VISIT (OUTPATIENT)
Dept: PHYSICAL THERAPY | Facility: CLINIC | Age: 53
End: 2018-10-18
Payer: COMMERCIAL

## 2018-10-18 DIAGNOSIS — M25.551 HIP PAIN, RIGHT: ICD-10-CM

## 2018-10-18 PROCEDURE — 97140 MANUAL THERAPY 1/> REGIONS: CPT | Mod: GP | Performed by: PHYSICAL THERAPIST

## 2018-10-18 PROCEDURE — 97110 THERAPEUTIC EXERCISES: CPT | Mod: GP | Performed by: PHYSICAL THERAPIST

## 2018-10-26 ENCOUNTER — THERAPY VISIT (OUTPATIENT)
Dept: PHYSICAL THERAPY | Facility: CLINIC | Age: 53
End: 2018-10-26
Payer: COMMERCIAL

## 2018-10-26 DIAGNOSIS — M25.551 HIP PAIN, RIGHT: ICD-10-CM

## 2018-10-26 PROCEDURE — 97110 THERAPEUTIC EXERCISES: CPT | Mod: GP | Performed by: PHYSICAL THERAPIST

## 2018-10-26 PROCEDURE — 97140 MANUAL THERAPY 1/> REGIONS: CPT | Mod: GP | Performed by: PHYSICAL THERAPIST

## 2019-03-08 ENCOUNTER — TELEPHONE (OUTPATIENT)
Dept: FAMILY MEDICINE | Facility: CLINIC | Age: 54
End: 2019-03-08

## 2019-03-08 NOTE — TELEPHONE ENCOUNTER
Panel Management Review      Patient has the following on her problem list: None      Composite cancer screening  Chart review shows that this patient is due/due soon for the following: Colonoscopy/Fecal Colorectal (FIT)  Summary:    Patient is due/failing the following:     Health Maintenance Due   Topic Date Due     COLON CANCER SCREEN (SYSTEM ASSIGNED)  09/30/2017     ZOSTER IMMUNIZATION (1 of 2) 09/30/2017     INFLUENZA VACCINE (1) 09/01/2018         Action needed:   FIT/colonoscopy    Type of outreach:    Sent Fonmatch message.    Questions for provider review:    None                                                                                                                                    Mary Ann Moss CMA       Chart routed to none .

## 2019-07-10 PROBLEM — M25.551 HIP PAIN, RIGHT: Status: RESOLVED | Noted: 2017-08-30 | Resolved: 2019-07-10

## 2019-07-10 NOTE — PROGRESS NOTES
Discharge Note    Progress reporting period is from initial eval to Oct 26, 2018.     Lamar failed to return for next follow up visit and current status is unknown.  Please see information below for last relevant information on current status.  Patient seen for 3 visits.    SUBJECTIVE  Subjective changes noted by patient:  Pt states her sxs are starting to feel better. Rates pain at 3/10 today. Has been using tennis ball and stretching a lot. Doing some strength training ex but not as frequent.  .  Current pain level is 3/10.     Previous pain level was   .   Changes in function:  Yes (See Goal flowsheet attached for changes in current functional level)  Adverse reaction to treatment or activity: None    OBJECTIVE  Changes noted in objective findings: Negative SLR and Slump test for tightness/sxs     ASSESSMENT/PLAN  Diagnosis: R hip pain/piriformis syndrome   DIAGP:  The encounter diagnosis was Hip pain, right.  STG/LTGs have been met or progress has been made towards goals:  Yes, please see goal flowsheet for most current information  Assessment of Progress: current status is unknown.    Last current status: Pt is progressing well   Self Management Plans:  HEP  I have re-evaluated this patient and find that the nature, scope, duration and intensity of the therapy is appropriate for the medical condition of the patient.  Lamar continues to require the following intervention to meet STG and LTG's:  HEP.    Recommendations:  Discharge with current home program.  Patient to follow up with MD as needed.    Please refer to the daily flowsheet for treatment today, total treatment time and time spent performing 1:1 timed codes.

## 2019-07-11 ENCOUNTER — ANCILLARY PROCEDURE (OUTPATIENT)
Dept: MAMMOGRAPHY | Facility: CLINIC | Age: 54
End: 2019-07-11
Attending: NURSE PRACTITIONER
Payer: COMMERCIAL

## 2019-07-11 DIAGNOSIS — Z12.31 VISIT FOR SCREENING MAMMOGRAM: ICD-10-CM

## 2019-07-11 PROCEDURE — 77067 SCR MAMMO BI INCL CAD: CPT | Mod: TC

## 2019-07-18 ENCOUNTER — OFFICE VISIT (OUTPATIENT)
Dept: FAMILY MEDICINE | Facility: CLINIC | Age: 54
End: 2019-07-18
Payer: COMMERCIAL

## 2019-07-18 VITALS
HEART RATE: 68 BPM | TEMPERATURE: 98.8 F | DIASTOLIC BLOOD PRESSURE: 70 MMHG | RESPIRATION RATE: 20 BRPM | SYSTOLIC BLOOD PRESSURE: 100 MMHG

## 2019-07-18 DIAGNOSIS — S16.1XXA STRAIN OF NECK MUSCLE, INITIAL ENCOUNTER: Primary | ICD-10-CM

## 2019-07-18 PROCEDURE — 99213 OFFICE O/P EST LOW 20 MIN: CPT | Performed by: NURSE PRACTITIONER

## 2019-07-18 ASSESSMENT — ENCOUNTER SYMPTOMS
CONSTIPATION: 0
LIGHT-HEADEDNESS: 0
RHINORRHEA: 0
SINUS PRESSURE: 0
FATIGUE: 0
SORE THROAT: 0
FEVER: 0
COUGH: 0
HEADACHES: 0
ARTHRALGIAS: 1
EYE ITCHING: 0
CHILLS: 0
WHEEZING: 0
DIAPHORESIS: 0
SHORTNESS OF BREATH: 0
EYE DISCHARGE: 0
DIARRHEA: 0
NAUSEA: 0
DIZZINESS: 0

## 2019-07-18 ASSESSMENT — PAIN SCALES - GENERAL: PAINLEVEL: MODERATE PAIN (4)

## 2019-07-18 NOTE — PROGRESS NOTES
Subjective     Lamar Zimmerman is a 53 year old female who presents to clinic today for the following health issues:    HPI   Musculoskeletal problem/pain      Duration: 1 week    Description  Location: right shoulder and across shoulder blades    Intensity:  4/10    Accompanying signs and symptoms: aching and tingling in right elbow, right fingers feel tight, radiation of pain to right neck and right face    History  Previous similar problem: YES- 19 years ago had bursitis. Pain this time is different.   Previous evaluation:  none    Precipitating or alleviating factors:  Trauma or overuse: no   Aggravating factors include: turning head to right    Therapies tried and outcome: Aleve helps some, stretching feels good    Current Outpatient Medications   Medication Sig Dispense Refill     Naproxen Sodium (ALEVE PO)        cyclobenzaprine (FLEXERIL) 10 MG tablet Take 1 tablet (10 mg) by mouth 2 times daily as needed for muscle spasms No driving after taking medication for 6 hours. (Patient not taking: Reported on 7/18/2019) 320 tablet 1     Allergies   Allergen Reactions     Erythromycin Rash     Penicillin [Penicillins] Rash     Sulfa Drugs Rash     Voltaren [Nsaids] Rash     Nickel Rash       Reviewed and updated as needed this visit by Provider         Right shoulder pain. Having tightness in fingers on right hand. Is having aching. Will have pain to right shoulder that will go to neck and to back of shoulder. No longer having pain to shoulder as much but is still there. Feeling aching in upper back. No injury that is aware of. Is not going to the gym any more. Thinks that may need PHYSICAL THERAPY, leaving on Sunday for [place] for 9 days. Has been using ice.       Objective    /70 (BP Location: Left arm, Patient Position: Sitting, Cuff Size: Adult Large)   Pulse 68   Temp 98.8  F (37.1  C) (Tympanic)   Resp 20   LMP  (LMP Unknown)   There is no height or weight on file to calculate BMI.           Assessment & Plan       Review of Systems   Constitutional: Negative for chills, diaphoresis, fatigue and fever.   HENT: Negative for ear discharge, ear pain, hearing loss, rhinorrhea, sinus pressure and sore throat.    Eyes: Negative for discharge and itching.   Respiratory: Negative for cough, shortness of breath and wheezing.    Gastrointestinal: Negative for constipation, diarrhea and nausea.   Musculoskeletal: Positive for arthralgias (right shoulder and neck pain).   Skin: Negative for rash.   Neurological: Negative for dizziness, light-headedness and headaches.        Tingling to right elbow and fingers. Feels tight         Physical Exam   Constitutional: She appears well-developed and well-nourished.   Cardiovascular: Normal rate and regular rhythm.   Pulmonary/Chest: Effort normal and breath sounds normal.   Musculoskeletal:        Right shoulder: She exhibits normal range of motion, no tenderness, no bony tenderness, no swelling, no pain, no spasm and normal strength.        Cervical back: She exhibits decreased range of motion, tenderness and pain. She exhibits no bony tenderness, no swelling and no spasm.        Back:    Neurological: She is alert.   Skin: Skin is warm.   Psychiatric: She has a normal mood and affect.         1. Strain of neck muscle, initial encounter  Has muscle relaxer at home, cyclobenzaprine 10 mg.  May take that as needed.  Continue naproxen twice per day with food.  Alternate ice and heat.  Encouraged to work on posture.  Demonstrated stretching exercises to help with range of motion.  Notify if no improvement when returns from trip and may need to refer to physical therapy.      Return in about 2 weeks (around 8/1/2019), or if symptoms worsen or fail to improve.    [facility]

## 2019-07-18 NOTE — PATIENT INSTRUCTIONS
Alternate ice and heat    Continue with stretching exercises    Work on your posture.     Make sure you are eating with the naproxen

## 2019-08-30 ENCOUNTER — TELEPHONE (OUTPATIENT)
Dept: FAMILY MEDICINE | Facility: CLINIC | Age: 54
End: 2019-08-30

## 2019-08-30 DIAGNOSIS — Z12.11 ENCOUNTER FOR FIT (FECAL IMMUNOCHEMICAL TEST) SCREENING: Primary | ICD-10-CM

## 2019-10-30 ENCOUNTER — IMMUNIZATION (OUTPATIENT)
Dept: FAMILY MEDICINE | Facility: CLINIC | Age: 54
End: 2019-10-30
Payer: COMMERCIAL

## 2019-10-30 PROCEDURE — 90471 IMMUNIZATION ADMIN: CPT

## 2019-10-30 PROCEDURE — 90682 RIV4 VACC RECOMBINANT DNA IM: CPT

## 2020-02-26 ENCOUNTER — TELEPHONE (OUTPATIENT)
Dept: FAMILY MEDICINE | Facility: CLINIC | Age: 55
End: 2020-02-26

## 2020-02-26 NOTE — TELEPHONE ENCOUNTER
Panel Management Review      Patient has the following on her problem list: None      Composite cancer screening  Chart review shows that this patient is due/due soon for the following Pap Smear, Mammogram, Colonoscopy/Fecal Colorectal (FIT)  Summary:    Patient is due/failing the following:   Health Maintenance Due   Topic Date Due     COLONOSCOPY  09/30/1977     ZOSTER IMMUNIZATION (1 of 2) 09/30/2017     PREVENTIVE CARE VISIT  10/04/2019     PHQ-2  01/01/2020     HPV TEST  01/19/2020     PAP  01/19/2020         Action needed:   Patient needs office visit for physical with pap and fasting labs and discuss rest of health maintenance.    Type of outreach:    Sent letter.    Questions for provider review:    None                                                                                                                                    Mary Ann Moss CMA       Chart routed to none .

## 2020-02-26 NOTE — LETTER
February 26, 2020      Lamar Zimmerman  [address] [place] [id number]      Dear Lamar Zimmerman    We care about your health and have reviewed your health plan including your medical conditions, medication list, and lab results.  Based on this review, it is recommended that you follow up regarding the following health topic(s):     -Cholesterol  -Breast Cancer Screening  -Colon Cancer Screening  -Cervical Cancer Screening  -Wellness (Physical) Visit     We recommend you take the following action(s):  -schedule a WELLNESS (Physical) APPOINTMENT.  We will perform the following labs: Lipids (fasting cholesterol - nothing to eat except water and/or meds for 8-10 hours) and CMP(complete metabolic panel).    Please call us at [phone] (or use [email or web address]) to address the above recommendations.     Thank you for trusting [facility] and we appreciate the opportunity to serve you.  We look forward to supporting your healthcare needs in the future.    Healthy Regards,      Your Health Care Team  [facility]

## 2020-08-26 ENCOUNTER — ANCILLARY PROCEDURE (OUTPATIENT)
Dept: GENERAL RADIOLOGY | Facility: CLINIC | Age: 55
End: 2020-08-26
Attending: PHYSICIAN ASSISTANT
Payer: COMMERCIAL

## 2020-08-26 ENCOUNTER — OFFICE VISIT (OUTPATIENT)
Dept: FAMILY MEDICINE | Facility: CLINIC | Age: 55
End: 2020-08-26
Payer: COMMERCIAL

## 2020-08-26 VITALS — OXYGEN SATURATION: 98 % | TEMPERATURE: 98.4 F | HEART RATE: 87 BPM

## 2020-08-26 DIAGNOSIS — E66.01 MORBID OBESITY (H): ICD-10-CM

## 2020-08-26 DIAGNOSIS — M25.562 ACUTE PAIN OF LEFT KNEE: Primary | ICD-10-CM

## 2020-08-26 PROCEDURE — 73562 X-RAY EXAM OF KNEE 3: CPT | Mod: LT

## 2020-08-26 PROCEDURE — 99214 OFFICE O/P EST MOD 30 MIN: CPT | Performed by: PHYSICIAN ASSISTANT

## 2020-08-26 ASSESSMENT — ENCOUNTER SYMPTOMS
LIGHT-HEADEDNESS: 0
SHORTNESS OF BREATH: 0
SORE THROAT: 0
FEVER: 0
ABDOMINAL PAIN: 0
NERVOUS/ANXIOUS: 0

## 2020-08-26 NOTE — PATIENT INSTRUCTIONS
Your x-rays do show moderate arthritis in your left knee.  Additionally, your exam is concerning for a meniscus tear.  For treatment please use rest, ice, compression, heat, Tylenol/ibuprofen.  A referral has been placed to orthopedics for further evaluation and treatment.  Please contact the number provided.  Reach out with any questions or concerns in the meantime.    Continue to work on weight loss with healthy eating.  You may use the racquel lose it or start weight watchers.  Weight watchers has been proven to be 1 of the most successful programs of their and tracking her food has also been proven to be very successful.  Remember that small changes at up to large results.      Patient Education     Knee Pain  Knee pain is very common. It s especially common in active people who put a lot of pressure on their knees, like runners. It affects women more often than men.  Your kneecap (patella) is a thick, round bone. It covers and protects the front portion of your knee joint. It moves along a groove in your thighbone (femur) as part of the patellofemoral joint. A layer of cartilage surrounds the underside of your kneecap. This layer protects it from grinding against your femur.  When this cartilage softens and breaks down, it can cause knee pain. This is partly because of repetitive stress. The stress irritates the lining of the joint. This causes pain in the underlying bone.  What causes knee pain?  Many things can cause knee pain. You may have more than one cause. Some of these include:    Overuse of the knee joint    The kneecap doesn t line up with the tissue around it    Damage to small nerves in the area    Damage to the ligament-like structure that holds the kneecap in place (retinaculum)    Breakdown of the bone under the cartilage    Swelling in the soft tissues around the kneecap    Injury  You might be more likely to have knee pain if you:    Exercise a lot    Recently increased the intensity of your  workouts    Have a body mass index (BMI) greater than 25    Have poor alignment of your kneecap    Walk with your feet turned overly outward or inward    Have weakness in surrounding muscle groups (inner quad or hip adductor muscles)    Have too much tightness in surrounding muscle groups (hamstrings or iliotibial band)    Have a recent history of injury to the area    Are female  Symptoms of knee pain  This type of knee pain is a dull, aching pain in the front of the knee in the area under and around the kneecap. This pain may start quickly or slowly. Your pain might be worse when you squat, run, or sit for a long time. Stairclimbing may be painful or difficult. You might also sometimes feel like your knee is giving out. You may have symptoms in one or both of your knees.  Diagnosing knee pain  Your healthcare provider will ask about your medical history and your symptoms. Be sure to describe any activities that make your knee pain worse. He or she will look at your knee. This will include tests of your range of motion, strength, and areas of pain of your knee. Your knee alignment will be checked.  Your healthcare provider will need to rule out other causes of your knee pain, such as arthritis. You may need an imaging test, such as an X-ray or MRI.  Treatment for knee pain  Treatments that can help ease your symptoms may include:    Avoiding activities for a while that make your pain worse, returning to activity over time    Icing the outside of your knee when it causes you pain    Taking over-the-counter pain medicine    Wearing a knee brace or taping your knee to support it    Wearing special shoe inserts to help keep your feet in the proper alignment    Doing special exercises to stretch and strengthen the muscles around your hip and your knee  These steps help most people manage knee pain. But some cases of knee pain need to be treated with surgery. You rarely need surgery right away. You may need it later if  other treatments don t work. Your healthcare provider may refer you to an orthopedic surgeon. He or she will talk with you about your choices.  Preventing knee pain  Losing weight and correcting excess muscle tightness or muscle weakness may help lower your risk.  In some cases, you can prevent knee pain. To help prevent a flare-up of knee pain, do these things:    Regularly do all the exercises your doctor or physical therapist advises    Support your knee as advised by your doctor or physical therapist    Increase training gradually, and ease up on training when needed    Have an expert check your gait for running or other sporting activities    Stretch properly before and after exercise    Replace your running shoes regularly    Lose excess weight  When to call your healthcare provider  Call your healthcare provider right away if:    Your symptoms don t get better after a few weeks of treatment    You have any new symptoms  Date Last Reviewed: 4/1/2017 2000-2019 [employer]. [address] [place] [address]. All rights reserved. This information is not intended as a substitute for professional medical care. Always follow your healthcare professional's instructions.

## 2020-08-26 NOTE — PROGRESS NOTES
Subjective     Lamar Zimmerman is a 54 year old female who presents to clinic today for the following health issues:    HPI     Patient notes that she has been increasing stretching for piriformis syndrome and while stretching had sudden left knee pain at posterior aspect. The knee now feels like it wants to hyperextend. Pain radiates up to buttock and down into calf. No numbness or tingling. Knee does not give out. No redness or swelling.     Musculoskeletal problem/pain  Onset/Duration: 1 month  Description  Location: knee - left  Joint Swelling: YES- slight  Redness: no  Pain: YES- behind the knee  Warmth: no  Intensity:  moderate  Progression of Symptoms:  intermittent  Accompanying signs and symptoms:   Fevers: no  Numbness/tingling/weakness: YES  History  Trauma to the area: no  Recent illness:  no  Previous similar problem: no  Previous evaluation:  no  Precipitating or alleviating factors:  Aggravating factors include: climbing stairs  Therapies tried and outcome: Half of a muscle relaxer-helped her sleep but she does not like to take.    Now feels tingling on right side-shoulder down to elbow.     Review of Systems   Constitutional: Negative for fever.   HENT: Negative for congestion and sore throat.    Respiratory: Negative for shortness of breath.    Cardiovascular: Negative for chest pain.   Gastrointestinal: Negative for abdominal pain.   Musculoskeletal:        Left knee pain     Skin: Negative for rash.   Neurological: Negative for light-headedness.   Psychiatric/Behavioral: The patient is not nervous/anxious.             Objective    Pulse 87   Temp 98.4  F (36.9  C) (Tympanic)   LMP  (LMP Unknown)   SpO2 98%   There is no height or weight on file to calculate BMI.  Physical Exam  Vitals signs and nursing note reviewed.   Constitutional:       General: She is not in acute distress.     Appearance: Normal appearance.   HENT:      Head: Normocephalic and atraumatic.   Eyes:      Extraocular  Movements: Extraocular movements intact.      Pupils: Pupils are equal, round, and reactive to light.   Neck:      Musculoskeletal: Normal range of motion.   Cardiovascular:      Rate and Rhythm: Normal rate and regular rhythm.      Heart sounds: Normal heart sounds.   Pulmonary:      Effort: Pulmonary effort is normal.      Breath sounds: Normal breath sounds.   Abdominal:      Tenderness: There is no abdominal tenderness.   Musculoskeletal: Normal range of motion.      Comments: Left knee: Sensation intact.  No left lower extremity edema.  Extension 0, flexion 130.  Positive Rayna's laterally.  No tenderness palpation of left knee.  Negative Lockman's.  Able to ambulate easily.  No tenderness with varus or valgus force.  No pseudolaxity.   Skin:     General: Skin is warm and dry.   Neurological:      General: No focal deficit present.      Mental Status: She is alert.   Psychiatric:         Mood and Affect: Mood normal.         Behavior: Behavior normal.            Xray -left knee 3 views: Per my interpretation, moderate tricompartmental osteoarthritis with osteophyte formation.  No signs of fracture or bony abnormalities otherwise.        Assessment & Plan     Acute pain of left knee  Left knee x-rays without signs of fracture, but do show moderate tricompartmental OA.  Additionally, exam with positive Rayna's suggesting potential lateral meniscus tear.  Discussed symptomatic treatment including heat/ice, Tylenol/ibuprofen, compression.  Recommend follow-up with orthopedics for further evaluation and treatment.  Referral placed.    - XR Knee Left 3 Views  - Orthopedic & Spine  Referral; Future    Morbid obesity (H)  Discussed the importance of weight loss related to knee health.  Discussed successful programs and options.  Recommended small changes over time regarding nutrition and recommended continued exercise as we will tolerate.      See Patient Instructions    Return in about 4 weeks (around  9/23/2020), or if symptoms worsen or fail to improve.    Deisi Buenrostro PA-C  [facility]

## 2020-08-27 ENCOUNTER — HOSPITAL ENCOUNTER (OUTPATIENT)
Dept: MAMMOGRAPHY | Facility: CLINIC | Age: 55
Discharge: HOME | End: 2020-08-27
Attending: NURSE PRACTITIONER | Admitting: NURSE PRACTITIONER
Payer: COMMERCIAL

## 2020-08-27 DIAGNOSIS — Z12.31 SCREENING MAMMOGRAM FOR HIGH-RISK PATIENT: ICD-10-CM

## 2020-08-27 PROCEDURE — 77067 SCR MAMMO BI INCL CAD: CPT

## 2020-11-22 ENCOUNTER — HEALTH MAINTENANCE LETTER (OUTPATIENT)
Age: 55
End: 2020-11-22

## 2021-04-19 ENCOUNTER — VIRTUAL VISIT (OUTPATIENT)
Dept: FAMILY MEDICINE | Facility: CLINIC | Age: 56
End: 2021-04-19
Payer: COMMERCIAL

## 2021-04-19 DIAGNOSIS — G44.019 EPISODIC CLUSTER HEADACHE, NOT INTRACTABLE: Primary | ICD-10-CM

## 2021-04-19 PROCEDURE — 99213 OFFICE O/P EST LOW 20 MIN: CPT | Mod: GT | Performed by: NURSE PRACTITIONER

## 2021-04-19 ASSESSMENT — ENCOUNTER SYMPTOMS
PARESTHESIAS: 0
EYE DISCHARGE: 1
FACIAL ASYMMETRY: 0
DIZZINESS: 0
NUMBNESS: 0
WEAKNESS: 0
SHORTNESS OF BREATH: 0
HEADACHES: 1

## 2021-04-19 NOTE — PROGRESS NOTES
Lamar is a 55 year old who is being evaluated via a billable video visit.      How would you like to obtain your AVS? MyChart  If the video visit is dropped, the invitation should be resent by: Text to cell phone: [phone]  Will anyone else be joining your video visit? No  Video Start Time: 1:19 PM    Assessment & Plan     Episodic cluster headache, not intractable  Education given.  Encouraged cool compress, dark room and deep breathing at onset of headache  Plan to continue to monitor.  If cluster headaches increase in frequency and/or intensity consider subcutaneous sumatriptan or daily preventative like verapamil.  Educated regarding warning signs to watch for and when would need to seek immediate medical attention.      Review of the result(s) of each unique test - Vitamin D, TSH, lipid, CMP, CBC  16 minutes spent on the date of the encounter doing chart review, history and exam, documentation and further activities per the note       No follow-ups on file.    TOY Zapata CNP  [facility] [place]    Yuly Newton is a 55 year old who presents for the following health issues     HPI     Headache  Onset/Duration: first episode 4/13/21 8:30 pm, second episode 4/17/21 3:00 am  Description  Location: pain is behind right eyebrow, feels like something is popping  Character: sharp pain, pulsating, truman pain  Frequency:  Two episodes  Duration:  First episode 10 minutes, second episode 3 minutes  Wake with headaches: YES  Able to do daily activities when headache present: no   Intensity:  severe  Progression of Symptoms:  improving  Accompanying signs and symptoms: fatigue when headache goes away, eyes water during headache  Stiff neck: no  Neck or upper back pain: YES-sore neck for months, thinks she needs a new bed  Sinus or URI symptoms no   Fever: no  Nausea or vomiting: no  Dizziness: no  Numbness/tingling: no  Weakness: no  Visual changes: none  History  Head trauma:  no  Family history of migraines: no  Daily pain medication use: no   Previous tests for headaches: no  Neurologist evaluation: no  Precipitating or Alleviating factors (light/sound/sleep/caffeine): unsure  Therapies tried and outcome: heat, cold              Outcome - cold helped  Frequent/daily pain medication use:  takes medication for arthritic knee- Naproxen, Ibuprofen, Tylenol. Also takes CBD gummies.     Video visit completed due to COVID 19 outbreak.     Friday Was in a zoom meeting. Felt a ping behind eyebrow. As time went on got sharper and sharper. Pain took about 10 minutes and then got better. Eyes watered after that. Was exhausted afterworlds. Normally does not have any headaches. Then on Friday night woke with the ping in to head. Got a cool cloth and that really helped to get rid of the pain. Really does not feel like is under more stress. Not sure what would be triggering headaches. Does not smoke or drink alcohol. No previous concussions. No numbness or tingling. Eyes watered but did not have any vision changes. Was albe to walk, talk, eat, drink, moves arms and legs during the episode. No new foods or medications.     Review of Systems   Eyes: Positive for discharge (eyes).   Respiratory: Negative for shortness of breath.    Cardiovascular: Negative for chest pain.   Neurological: Positive for headaches (behind right eye). Negative for dizziness, facial asymmetry, weakness, numbness and paresthesias.         Objective           Vitals:  No vitals were obtained today due to virtual visit.    Physical Exam  Constitutional:       Appearance: Normal appearance.   HENT:      Nose: No congestion.   Eyes:      General: Lids are normal.   Pulmonary:      Effort: No tachypnea, bradypnea or respiratory distress.   Skin:     Coloration: Skin is not ashen, cyanotic, jaundiced or pale.   Neurological:      Mental Status: She is alert.   Psychiatric:         Mood and Affect: Mood normal.         Speech: Speech  normal.         Behavior: Behavior normal.               Video-Visit Details    Type of service:  Video Visit    Video End Time:1:31 PM    Originating Location (pt. Location): Home    Distant Location (provider location):  [facility] [place]     Platform used for Video Visit: Jewell

## 2021-04-20 ENCOUNTER — TELEPHONE (OUTPATIENT)
Dept: FAMILY MEDICINE | Facility: CLINIC | Age: 56
End: 2021-04-20

## 2021-04-20 NOTE — TELEPHONE ENCOUNTER
Panel Management Review      Patient has the following on her problem list: None      Composite cancer screening  Chart review shows that this patient is due/due soon for the following Pap Smear and Colonoscopy  Summary:    Patient is due/failing the following:   Health Maintenance Due   Topic Date Due     ADVANCE CARE PLANNING  Never done     COLORECTAL CANCER SCREENING  Never done     HEPATITIS C SCREENING  Never done     ZOSTER IMMUNIZATION (1 of 2) Never done     PREVENTIVE CARE VISIT  10/04/2019     HPV TEST  01/19/2020     PAP  01/19/2020     INFLUENZA VACCINE (1) 09/01/2020     PHQ-2  01/01/2021         Action needed:   Patient needs office visit for physical with pap and discuss colon cancer screening.    Type of outreach:    Sent letter.    Questions for provider review:    None                                                                                                                                    Mary Ann Moss CMA       Chart routed to none .

## 2021-04-20 NOTE — LETTER
April 20, 2021      Lamar Zimmerman  [address] [place] [id number]      Dear Lamar Zimmerman    We care about your health and have reviewed your health plan including your medical conditions, medication list, and lab results.  Based on this review, it is recommended that you follow up regarding the following health topic(s):     -Colon Cancer Screening  -Cervical Cancer Screening  -Wellness (Physical) Visit     We recommend you take the following action(s):  -schedule a WELLNESS (Physical) APPOINTMENT.  We will perform the following labs: pap.     At your physical we will plan to further discuss your health maintenance items that are due.      Please call us at [phone] (or use [facility]) to address the above recommendations.     Thank you for trusting [facility] and we appreciate the opportunity to serve you.  We look forward to supporting your healthcare needs in the future.    Healthy Regards,      Your Health Care Team

## 2021-05-17 ENCOUNTER — OFFICE VISIT (OUTPATIENT)
Dept: FAMILY MEDICINE | Facility: CLINIC | Age: 56
End: 2021-05-17
Payer: COMMERCIAL

## 2021-05-17 ENCOUNTER — MYC MEDICAL ADVICE (OUTPATIENT)
Dept: FAMILY MEDICINE | Facility: CLINIC | Age: 56
End: 2021-05-17

## 2021-05-17 VITALS
SYSTOLIC BLOOD PRESSURE: 112 MMHG | DIASTOLIC BLOOD PRESSURE: 88 MMHG | HEART RATE: 104 BPM | RESPIRATION RATE: 24 BRPM | TEMPERATURE: 98.2 F

## 2021-05-17 DIAGNOSIS — L72.3 INFLAMED EPIDERMOID CYST OF SKIN: Primary | ICD-10-CM

## 2021-05-17 PROCEDURE — 10060 I&D ABSCESS SIMPLE/SINGLE: CPT | Performed by: NURSE PRACTITIONER

## 2021-05-17 ASSESSMENT — PAIN SCALES - GENERAL: PAINLEVEL: NO PAIN (0)

## 2021-05-17 NOTE — TELEPHONE ENCOUNTER
Called patient and scheduled her for an office visit today at 2:40 with PCP.    Patient stated understanding and agreeable with the plan of care.   Renetta MITCHELL-RN.  [facility]

## 2021-05-17 NOTE — PROGRESS NOTES
Assessment & Plan     Inflamed epidermoid cyst of skin  Tolerated procedure well.  Educated on care of area and symptoms to watch for.  Cleanse area with soap and water.  Encouraged not to submerge in water.  - Skin Abscess, Simple [04354]    38 minutes spent on the date of the encounter doing chart review, history and exam, documentation and further activities per the note        No follow-ups on file.    TOY Zapata CNP  [facility] [place] Newton is a 55 year old who presents for the following health issues     HPI     Chief Complaint   Patient presents with     Cyst     cyst left axilla-see mychart encounter with picture from today       Area under left armpit that really started to notice on Sat. Was able to get a bit of drainage out of it. Seems to be staying the same size. Is putting warm compress on it and is not getting better.     Review of Systems   Constitutional: Negative for chills, diaphoresis, fatigue and fever.   HENT: Negative for ear discharge, ear pain, hearing loss, rhinorrhea, sinus pressure and sore throat.    Eyes: Negative for discharge and itching.   Respiratory: Negative for cough, shortness of breath and wheezing.    Gastrointestinal: Negative for constipation, diarrhea and nausea.   Skin: Negative for rash.        Painful, tender bump under left arm   Neurological: Negative for dizziness, light-headedness and headaches.          Objective    /88 (BP Location: Right arm, Patient Position: Sitting, Cuff Size: Adult Large)   Pulse 104   Temp 98.2  F (36.8  C) (Tympanic)   Resp 24   LMP  (LMP Unknown)   There is no height or weight on file to calculate BMI.  Physical Exam  Constitutional:       Appearance: She is well-developed.   Cardiovascular:      Rate and Rhythm: Normal rate and regular rhythm.   Pulmonary:      Effort: Pulmonary effort is normal.      Breath sounds: Normal breath sounds.   Skin:     General: Skin is warm.           Neurological:      Mental Status: She is alert.         Procedure: Incision and Drainage  After discussion of risks, benefits and side effects of procedure, consent was obtained.  Using 6  mL 1  % lidocaine with epi to site.  After good anesthesia, the site was prepped with Betadine. Using a 15 blade scalpel, the abscess of left axilla was excised and drained.  Copious purulent discharge was expressed with gentle pressure.   Patient tolerated the procedure without complication, minimal bleeding, site was dressed with proper wound dressing.

## 2021-05-18 ASSESSMENT — ENCOUNTER SYMPTOMS
CHILLS: 0
WHEEZING: 0
SORE THROAT: 0
EYE ITCHING: 0
NAUSEA: 0
FATIGUE: 0
FEVER: 0
COUGH: 0
RHINORRHEA: 0
SHORTNESS OF BREATH: 0
DIARRHEA: 0
DIZZINESS: 0
DIAPHORESIS: 0
CONSTIPATION: 0
EYE DISCHARGE: 0
LIGHT-HEADEDNESS: 0
HEADACHES: 0
SINUS PRESSURE: 0

## 2021-09-15 ENCOUNTER — ANCILLARY PROCEDURE (OUTPATIENT)
Dept: GENERAL RADIOLOGY | Facility: CLINIC | Age: 56
End: 2021-09-15
Attending: NURSE PRACTITIONER
Payer: COMMERCIAL

## 2021-09-15 ENCOUNTER — OFFICE VISIT (OUTPATIENT)
Dept: FAMILY MEDICINE | Facility: CLINIC | Age: 56
End: 2021-09-15
Payer: COMMERCIAL

## 2021-09-15 VITALS
DIASTOLIC BLOOD PRESSURE: 84 MMHG | SYSTOLIC BLOOD PRESSURE: 120 MMHG | HEIGHT: 65 IN | TEMPERATURE: 97.1 F | WEIGHT: 245 LBS | BODY MASS INDEX: 40.82 KG/M2 | HEART RATE: 76 BPM | RESPIRATION RATE: 16 BRPM

## 2021-09-15 DIAGNOSIS — M79.642 BILATERAL HAND PAIN: ICD-10-CM

## 2021-09-15 DIAGNOSIS — M79.641 BILATERAL HAND PAIN: ICD-10-CM

## 2021-09-15 DIAGNOSIS — Z12.4 SCREENING FOR MALIGNANT NEOPLASM OF CERVIX: ICD-10-CM

## 2021-09-15 DIAGNOSIS — M25.511 ACUTE PAIN OF RIGHT SHOULDER: ICD-10-CM

## 2021-09-15 DIAGNOSIS — R73.09 ABNORMAL GLUCOSE: ICD-10-CM

## 2021-09-15 DIAGNOSIS — Z23 INFLUENZA VACCINE NEEDED: ICD-10-CM

## 2021-09-15 DIAGNOSIS — S46.812A STRAIN OF LEFT TRAPEZIUS MUSCLE, INITIAL ENCOUNTER: Primary | ICD-10-CM

## 2021-09-15 DIAGNOSIS — Z12.11 SCREEN FOR COLON CANCER: ICD-10-CM

## 2021-09-15 DIAGNOSIS — Z11.59 ENCOUNTER FOR HEPATITIS C SCREENING TEST FOR LOW RISK PATIENT: ICD-10-CM

## 2021-09-15 DIAGNOSIS — Z12.31 ENCOUNTER FOR SCREENING MAMMOGRAM FOR BREAST CANCER: ICD-10-CM

## 2021-09-15 DIAGNOSIS — Z00.00 ROUTINE GENERAL MEDICAL EXAMINATION AT A HEALTH CARE FACILITY: ICD-10-CM

## 2021-09-15 LAB
ALBUMIN SERPL-MCNC: 3.8 G/DL (ref 3.4–5)
ALP SERPL-CCNC: 41 U/L (ref 40–150)
ALT SERPL W P-5'-P-CCNC: 29 U/L (ref 0–50)
ANION GAP SERPL CALCULATED.3IONS-SCNC: 2 MMOL/L (ref 3–14)
AST SERPL W P-5'-P-CCNC: 19 U/L (ref 0–45)
BASOPHILS # BLD AUTO: 0 10E3/UL (ref 0–0.2)
BASOPHILS NFR BLD AUTO: 1 %
BILIRUB SERPL-MCNC: 0.5 MG/DL (ref 0.2–1.3)
BUN SERPL-MCNC: 21 MG/DL (ref 7–30)
CALCIUM SERPL-MCNC: 8.6 MG/DL (ref 8.5–10.1)
CCP AB SER IA-ACNC: 0.6 U/ML
CHLORIDE BLD-SCNC: 109 MMOL/L (ref 94–109)
CHOLEST SERPL-MCNC: 271 MG/DL
CO2 SERPL-SCNC: 29 MMOL/L (ref 20–32)
CREAT SERPL-MCNC: 0.76 MG/DL (ref 0.52–1.04)
CRP SERPL-MCNC: <2.9 MG/L (ref 0–8)
EOSINOPHIL # BLD AUTO: 0.1 10E3/UL (ref 0–0.7)
EOSINOPHIL NFR BLD AUTO: 3 %
ERYTHROCYTE [DISTWIDTH] IN BLOOD BY AUTOMATED COUNT: 13.2 % (ref 10–15)
ERYTHROCYTE [SEDIMENTATION RATE] IN BLOOD BY WESTERGREN METHOD: 9 MM/HR (ref 0–30)
FASTING STATUS PATIENT QL REPORTED: YES
GFR SERPL CREATININE-BSD FRML MDRD: 90 ML/MIN/1.73M2
GLUCOSE BLD-MCNC: 98 MG/DL (ref 70–99)
HBA1C MFR BLD: 5.2 % (ref 0–5.6)
HCT VFR BLD AUTO: 43.8 % (ref 35–47)
HDLC SERPL-MCNC: 63 MG/DL
HGB BLD-MCNC: 14.2 G/DL (ref 11.7–15.7)
LDLC SERPL CALC-MCNC: 189 MG/DL
LYMPHOCYTES # BLD AUTO: 1.6 10E3/UL (ref 0.8–5.3)
LYMPHOCYTES NFR BLD AUTO: 39 %
MCH RBC QN AUTO: 31.3 PG (ref 26.5–33)
MCHC RBC AUTO-ENTMCNC: 32.4 G/DL (ref 31.5–36.5)
MCV RBC AUTO: 97 FL (ref 78–100)
MONOCYTES # BLD AUTO: 0.4 10E3/UL (ref 0–1.3)
MONOCYTES NFR BLD AUTO: 9 %
NEUTROPHILS # BLD AUTO: 1.9 10E3/UL (ref 1.6–8.3)
NEUTROPHILS NFR BLD AUTO: 48 %
NONHDLC SERPL-MCNC: 208 MG/DL
PLATELET # BLD AUTO: 270 10E3/UL (ref 150–450)
POTASSIUM BLD-SCNC: 4 MMOL/L (ref 3.4–5.3)
PROT SERPL-MCNC: 7.2 G/DL (ref 6.8–8.8)
RBC # BLD AUTO: 4.53 10E6/UL (ref 3.8–5.2)
SODIUM SERPL-SCNC: 140 MMOL/L (ref 133–144)
TRIGL SERPL-MCNC: 93 MG/DL
TSH SERPL DL<=0.005 MIU/L-ACNC: 1.48 MU/L (ref 0.4–4)
WBC # BLD AUTO: 4 10E3/UL (ref 4–11)

## 2021-09-15 PROCEDURE — 80050 GENERAL HEALTH PANEL: CPT | Performed by: NURSE PRACTITIONER

## 2021-09-15 PROCEDURE — G0145 SCR C/V CYTO,THINLAYER,RESCR: HCPCS | Performed by: NURSE PRACTITIONER

## 2021-09-15 PROCEDURE — 83036 HEMOGLOBIN GLYCOSYLATED A1C: CPT | Performed by: NURSE PRACTITIONER

## 2021-09-15 PROCEDURE — 87624 HPV HI-RISK TYP POOLED RSLT: CPT | Performed by: NURSE PRACTITIONER

## 2021-09-15 PROCEDURE — 86038 ANTINUCLEAR ANTIBODIES: CPT | Performed by: NURSE PRACTITIONER

## 2021-09-15 PROCEDURE — 99214 OFFICE O/P EST MOD 30 MIN: CPT | Mod: 25 | Performed by: NURSE PRACTITIONER

## 2021-09-15 PROCEDURE — 86200 CCP ANTIBODY: CPT | Performed by: NURSE PRACTITIONER

## 2021-09-15 PROCEDURE — 80061 LIPID PANEL: CPT | Performed by: NURSE PRACTITIONER

## 2021-09-15 PROCEDURE — 73030 X-RAY EXAM OF SHOULDER: CPT | Mod: RT | Performed by: RADIOLOGY

## 2021-09-15 PROCEDURE — 90471 IMMUNIZATION ADMIN: CPT | Performed by: NURSE PRACTITIONER

## 2021-09-15 PROCEDURE — 86039 ANTINUCLEAR ANTIBODIES (ANA): CPT | Performed by: NURSE PRACTITIONER

## 2021-09-15 PROCEDURE — 73130 X-RAY EXAM OF HAND: CPT | Mod: 50 | Performed by: RADIOLOGY

## 2021-09-15 PROCEDURE — 85652 RBC SED RATE AUTOMATED: CPT | Performed by: NURSE PRACTITIONER

## 2021-09-15 PROCEDURE — 86431 RHEUMATOID FACTOR QUANT: CPT | Performed by: NURSE PRACTITIONER

## 2021-09-15 PROCEDURE — 86140 C-REACTIVE PROTEIN: CPT | Performed by: NURSE PRACTITIONER

## 2021-09-15 PROCEDURE — 82306 VITAMIN D 25 HYDROXY: CPT | Performed by: NURSE PRACTITIONER

## 2021-09-15 PROCEDURE — 86803 HEPATITIS C AB TEST: CPT | Performed by: NURSE PRACTITIONER

## 2021-09-15 PROCEDURE — 99396 PREV VISIT EST AGE 40-64: CPT | Mod: 25 | Performed by: NURSE PRACTITIONER

## 2021-09-15 PROCEDURE — 90682 RIV4 VACC RECOMBINANT DNA IM: CPT | Performed by: NURSE PRACTITIONER

## 2021-09-15 PROCEDURE — 36415 COLL VENOUS BLD VENIPUNCTURE: CPT | Performed by: NURSE PRACTITIONER

## 2021-09-15 ASSESSMENT — ENCOUNTER SYMPTOMS
FEVER: 0
WEAKNESS: 0
WHEEZING: 0
DYSURIA: 0
PARESTHESIAS: 0
EYE PAIN: 0
ARTHRALGIAS: 1
NECK PAIN: 1
CHILLS: 0
DIARRHEA: 0
CONSTIPATION: 0
FATIGUE: 0
NUMBNESS: 0
NERVOUS/ANXIOUS: 0
HEADACHES: 0
ACTIVITY CHANGE: 0
HEMATURIA: 0
ABDOMINAL PAIN: 0
FREQUENCY: 0
VOMITING: 0
JOINT SWELLING: 0
DIZZINESS: 0
RHINORRHEA: 0
POLYDIPSIA: 0
POLYPHAGIA: 0
SHORTNESS OF BREATH: 0
HEMATOCHEZIA: 0
NAUSEA: 0
DYSPHORIC MOOD: 0
COUGH: 0
SORE THROAT: 0
CHEST TIGHTNESS: 0
HEARTBURN: 0
DIFFICULTY URINATING: 0
ABDOMINAL DISTENTION: 0
MYALGIAS: 0
BREAST MASS: 0
PALPITATIONS: 0
SLEEP DISTURBANCE: 0
LIGHT-HEADEDNESS: 0

## 2021-09-15 ASSESSMENT — PAIN SCALES - GENERAL: PAINLEVEL: NO PAIN (0)

## 2021-09-15 ASSESSMENT — MIFFLIN-ST. JEOR: SCORE: 1713.22

## 2021-09-15 NOTE — PATIENT INSTRUCTIONS
Please go to pharmacy for shingles vaccine.       Preventive Health Recommendations  Female Ages 50 - 64    Yearly exam: See your health care provider every year in order to  o Review health changes.   o Discuss preventive care.    o Review your medicines if your doctor has prescribed any.      Get a Pap test every three years (unless you have an abnormal result and your provider advises testing more often).    If you get Pap tests with HPV test, you only need to test every 5 years, unless you have an abnormal result.     You do not need a Pap test if your uterus was removed (hysterectomy) and you have not had cancer.    You should be tested each year for STDs (sexually transmitted diseases) if you're at risk.     Have a mammogram every 1 to 2 years.    Have a colonoscopy at age 50, or have a yearly FIT test (stool test). These exams screen for colon cancer.      Have a cholesterol test every 5 years, or more often if advised.    Have a diabetes test (fasting glucose) every three years. If you are at risk for diabetes, you should have this test more often.     If you are at risk for osteoporosis (brittle bone disease), think about having a bone density scan (DEXA).    Shots: Get a flu shot each year. Get a tetanus shot every 10 years.    Nutrition:     Eat at least 5 servings of fruits and vegetables each day.    Eat whole-grain bread, whole-wheat pasta and brown rice instead of white grains and rice.    Get adequate Calcium and Vitamin D.     Lifestyle    Exercise at least 150 minutes a week (30 minutes a day, 5 days a week). This will help you control your weight and prevent disease.    Limit alcohol to one drink per day.    No smoking.     Wear sunscreen to prevent skin cancer.     See your dentist every six months for an exam and cleaning.    See your eye doctor every 1 to 2 years.

## 2021-09-15 NOTE — PROGRESS NOTES
SUBJECTIVE:   CC: Lamar Zimmerman is an 55 year old woman who presents for preventive health visit.     Chief Complaint   Patient presents with     Physical     pt is fasting         Patient has been advised of split billing requirements and indicates understanding: Yes     Healthy Habits:     Getting at least 3 servings of Calcium per day:  Yes    Bi-annual eye exam:  Yes    Dental care twice a year:  Yes    Sleep apnea or symptoms of sleep apnea:  None    Diet:  Regular (no restrictions)    Frequency of exercise:  1 day/week    Duration of exercise:  Other    Taking medications regularly:  Not Applicable    Medication side effects:  Not applicable    PHQ-2 Total Score: 0    Additional concerns today:  No        Today's PHQ-2 Score:   PHQ-2 ( 1999 Pfizer) 9/15/2021   Q1: Little interest or pleasure in doing things 0   Q2: Feeling down, depressed or hopeless 0   PHQ-2 Score 0   Q1: Little interest or pleasure in doing things Not at all   Q2: Feeling down, depressed or hopeless Not at all   PHQ-2 Score 0       Abuse: Current or Past (Physical, Sexual or Emotional) - No  Do you feel safe in your environment? Yes    Have you ever done Advance Care Planning? (For example, a Health Directive, POLST, or a discussion with a medical provider or your loved ones about your wishes): No, advance care planning information given to patient to review.  Patient declined advance care planning discussion at this time.    Social History     Tobacco Use     Smoking status: Former Smoker     Packs/day: 0.00     Years: 0.00     Pack years: 0.00     Smokeless tobacco: Never Used     Tobacco comment: quit 1993   Substance Use Topics     Alcohol use: Yes     Comment: on occasion         Alcohol Use 9/15/2021   Prescreen: >3 drinks/day or >7 drinks/week? No   Prescreen: >3 drinks/day or >7 drinks/week? -       Reviewed orders with patient.  Reviewed health maintenance and updated orders accordingly - Yes  No current outpatient  medications on file.     Allergies   Allergen Reactions     Erythromycin Rash     Penicillin [Penicillins] Rash     Sulfa Drugs Rash     Voltaren [Nsaids] Rash     Nickel Rash       Breast Cancer Screening:    Breast CA Risk Assessment (FHS-7) 9/14/2021   Do you have a family history of breast, colon, or ovarian cancer? No / Unknown       click delete button to remove this line now  Mammogram Screening: Recommended annual mammography  Pertinent mammograms are reviewed under the imaging tab.    History of abnormal Pap smear: NO - age 30-65 PAP every 5 years with negative HPV co-testing recommended  PAP / HPV 1/19/2015 5/1/2012 9/29/2009   PAP (Historical) NIL NIL NIL     Reviewed and updated as needed this visit by clinical staff  Tobacco  Allergies  Meds   Med Hx  Surg Hx  Fam Hx  Soc Hx        Reviewed and updated as needed this visit by Provider                  Thinks has arthritis in hands. Knuckles feel tighter. Is right handed. Right hand is worse than left. No real pain just dull aching all the time. No numbness or tingling.  to right hand is less.     Thinks periformas radiates down right leg at times.     Left shoulder is tight and goes to side of chest. Has had bursitis in the past and does not feel like that. No injury that is aware of. Is able to sleep on it.     Left knee with pain, right knee is feeling the same. Is getting injections into left knee.     Neck To left side is painful at times. Thinks is just arthritis. Is able to stretch it out.     Last Pap: 2015-normal-never an abnormal pap  Last mammogram: Has appointment in 9/2021. No family history of breast cancer.   Last BMD: N/A-thinks LMP was about 7 years ago  Last Colonoscopy: Plans to do. No family history of colon cancer.   Last eye exam: Yearly   Last dental exam: Every 6 mo  Last tetanus vaccine: 2018  Last influenza vaccine: Plans to do here today  Last shingles vaccine: Plans to get at the pharmacy.   Last pneumonia  "vaccine:  Last COVID vaccine: Has had both doses.   Hep C screen: Plans to do here today  HIV screen: done 2018  AAA screen (age 65-78 with smoking hx): N/A  IVD (HTN, Hyperlipid, Smoking): N/A  Lung CA screening (55-80, 30 pk smoking hx): N/A    Review of Systems   Constitutional: Negative for activity change and fatigue.   HENT: Negative for ear pain, rhinorrhea and sore throat.    Respiratory: Negative for cough, chest tightness, shortness of breath and wheezing.    Cardiovascular: Negative for chest pain and palpitations.   Gastrointestinal: Negative for abdominal distention, abdominal pain, constipation, diarrhea, nausea and vomiting.   Endocrine: Negative for cold intolerance, heat intolerance, polydipsia, polyphagia and polyuria.   Genitourinary: Negative for difficulty urinating, enuresis, frequency and urgency.   Musculoskeletal: Positive for arthralgias (bilat hands, right shoulder, bilat knees,) and neck pain (left neck-more in the muscle. ).   Skin: Negative for rash.   Neurological: Negative for dizziness, light-headedness, numbness and headaches.   Psychiatric/Behavioral: Negative for dysphoric mood and sleep disturbance. The patient is not nervous/anxious.         OBJECTIVE:   /84 (BP Location: Left arm, Patient Position: Sitting, Cuff Size: Adult Large)   Pulse 76   Temp 97.1  F (36.2  C) (Tympanic)   Resp 16   Ht 1.645 m (5' 4.75\")   Wt 111.1 kg (245 lb)   LMP  (LMP Unknown)   BMI 41.09 kg/m    Physical Exam  Constitutional:       Appearance: Normal appearance. She is well-developed.   HENT:      Head: Normocephalic and atraumatic.      Right Ear: Tympanic membrane and external ear normal. No middle ear effusion.      Left Ear: Tympanic membrane and external ear normal.  No middle ear effusion.      Nose: No mucosal edema.      Mouth/Throat:      Pharynx: Uvula midline.   Eyes:      Pupils: Pupils are equal, round, and reactive to light.   Neck:      Thyroid: No thyromegaly.      " Vascular: No carotid bruit.   Cardiovascular:      Rate and Rhythm: Normal rate and regular rhythm.      Pulses:           Femoral pulses are 2+ on the right side and 2+ on the left side.       Dorsalis pedis pulses are 2+ on the right side and 2+ on the left side.        Posterior tibial pulses are 2+ on the right side and 2+ on the left side.      Heart sounds: Normal heart sounds.   Pulmonary:      Effort: Pulmonary effort is normal.      Breath sounds: Normal breath sounds.   Chest:      Breasts: Breasts are symmetrical.         Right: No inverted nipple, mass, nipple discharge, skin change or tenderness.         Left: No inverted nipple, mass, nipple discharge, skin change or tenderness.   Abdominal:      General: Bowel sounds are normal.      Palpations: Abdomen is soft.      Tenderness: There is no abdominal tenderness.   Genitourinary:     Labia:         Right: No lesion.         Left: No lesion.       Vagina: Normal. No vaginal discharge or erythema.      Cervix: No cervical motion tenderness or discharge.      Uterus: Not enlarged.       Adnexa:         Right: No mass or tenderness.          Left: No mass or tenderness.     Musculoskeletal:      Right shoulder: Tenderness present. No swelling, effusion or bony tenderness. Normal range of motion. Normal strength.      Right hand: Swelling (Across knuckles) and bony tenderness present. No deformity. Normal range of motion. Normal strength.      Left hand: No swelling, deformity or bony tenderness. Normal range of motion. Normal strength.      Cervical back: Tenderness present. No deformity, erythema, rigidity or bony tenderness. Decreased range of motion.        Back:       Comments: Nodes noted to right hand   Skin:     General: Skin is warm and dry.      Findings: No rash.   Neurological:      Mental Status: She is alert.   Psychiatric:         Behavior: Behavior normal.         ASSESSMENT/PLAN:   Routine general medical examination at a health care  facility  Screening guidelines reviewed.   - REVIEW OF HEALTH MAINTENANCE PROTOCOL ORDERS  - Lipid panel reflex to direct LDL Fasting; Future  - Vitamin D Deficiency; Future  - TSH with free T4 reflex; Future  - CBC with Platelets & Differential; Future  - Comprehensive metabolic panel; Future    Screen for colon cancer  - Adult Gastro Ref - Procedure Only; Future    Influenza vaccine needed  Administered today in clinic  - INFLUENZA QUAD, RECOMBINANT, P-FREE (RIV4) (FLUBLOK)  - KS RIV4 (FLUBLOK) VACCINE RECOMBINANT DNA PRSRV ANTIBIO FREE, IM ([id number])    Bilateral hand pain  We will obtain labs here today.  We will obtain imaging.  Referral placed for physical therapy  - Anti Nuclear Delaney IgG by IFA with Reflex; Future  - Cyclic Citrullinated Peptide Antibody IgG; Future  - Rheumatoid factor; Future  - CRP inflammation; Future  - Erythrocyte sedimentation rate auto; Future  - XR Hand Right G/E 3 Views; Future  - XR Hand Left G/E 3 Views; Future  - [facility] Hand Referral; Future    Acute pain of right shoulder  We will obtain labs here today.  We will obtain imaging.  Referral placed for physical therapy  - Rheumatoid factor; Future  - CRP inflammation; Future  - XR Shoulder Right G/E 3 Views; Future  - [facility] Referral; Future    Strain of left trapezius muscle, initial encounter  - [facility] Hand Referral; Future    Encounter for hepatitis C screening test for low risk patient  - Hepatitis C Screen Reflex to HCV RNA Quant and Genotype; Future    Encounter for screening mammogram for breast cancer  Mammogram already scheduled  - MA Screening Digital Bilateral; Future    Screening for malignant neoplasm of cervix  - Pap Screen with HPV - recommended age 30 - 65 years    Abnormal glucose  - Hemoglobin A1c; Future    Patient has been advised of split billing requirements and indicates understanding: Yes  COUNSELING:  Reviewed preventive health counseling, as reflected in patient instructions       Regular  "exercise       Healthy diet/nutrition       Immunizations    Vaccinated for: Influenza.  Plans to go to pharmacy for shingles vaccine           Colon cancer screening       Consider Hep C screening for all patients one time for ages 18-79 years       HIV screeninx in teen years, 1x in adult years, and at intervals if high risk    Estimated body mass index is 41.09 kg/m  as calculated from the following:    Height as of this encounter: 1.645 m (5' 4.75\").    Weight as of this encounter: 111.1 kg (245 lb).    Weight management plan: Discussed healthy diet and exercise guidelines    She reports that she has quit smoking. She smoked 0.00 packs per day for 0.00 years. She has never used smokeless tobacco.      Counseling Resources:  ATP IV Guidelines  Pooled Cohorts Equation Calculator  Breast Cancer Risk Calculator  BRCA-Related Cancer Risk Assessment: FHS-7 Tool  FRAX Risk Assessment  ICSI Preventive Guidelines  Dietary Guidelines for Americans,   USDA's MyPlate  ASA Prophylaxis  Lung CA Screening    TOY Zapata CNP  [facility] [place]  "

## 2021-09-16 LAB
ANA PAT SER IF-IMP: ABNORMAL
ANA SER QL IF: POSITIVE
ANA TITR SER IF: ABNORMAL {TITER}
DEPRECATED CALCIDIOL+CALCIFEROL SERPL-MC: 40 UG/L (ref 20–75)
HCV AB SERPL QL IA: NONREACTIVE
RHEUMATOID FACT SER NEPH-ACNC: <7 IU/ML

## 2021-09-17 DIAGNOSIS — M25.50 MULTIPLE JOINT PAIN: Primary | ICD-10-CM

## 2021-09-17 DIAGNOSIS — R76.8 POSITIVE ANA (ANTINUCLEAR ANTIBODY): ICD-10-CM

## 2021-09-20 LAB
BKR LAB AP GYN ADEQUACY: NORMAL
BKR LAB AP GYN INTERPRETATION: NORMAL
BKR LAB AP HPV REFLEX: NORMAL
BKR LAB AP PREVIOUS ABNORMAL: NORMAL
PATH REPORT.COMMENTS IMP SPEC: NORMAL
PATH REPORT.RELEVANT HX SPEC: NORMAL

## 2021-09-22 LAB
HUMAN PAPILLOMA VIRUS 16 DNA: NEGATIVE
HUMAN PAPILLOMA VIRUS 18 DNA: NEGATIVE
HUMAN PAPILLOMA VIRUS FINAL DIAGNOSIS: NORMAL
HUMAN PAPILLOMA VIRUS OTHER HR: NEGATIVE

## 2021-09-28 ENCOUNTER — HOSPITAL ENCOUNTER (OUTPATIENT)
Dept: MAMMOGRAPHY | Facility: CLINIC | Age: 56
Discharge: HOME | End: 2021-09-28
Attending: NURSE PRACTITIONER | Admitting: NURSE PRACTITIONER
Payer: COMMERCIAL

## 2021-09-28 DIAGNOSIS — Z12.31 VISIT FOR SCREENING MAMMOGRAM: ICD-10-CM

## 2021-09-28 PROCEDURE — 77067 SCR MAMMO BI INCL CAD: CPT

## 2021-10-18 ENCOUNTER — THERAPY VISIT (OUTPATIENT)
Dept: OCCUPATIONAL THERAPY | Facility: CLINIC | Age: 56
End: 2021-10-18
Attending: NURSE PRACTITIONER
Payer: COMMERCIAL

## 2021-10-18 DIAGNOSIS — M79.641 BILATERAL HAND PAIN: ICD-10-CM

## 2021-10-18 DIAGNOSIS — M79.642 BILATERAL HAND PAIN: ICD-10-CM

## 2021-10-18 DIAGNOSIS — S46.812A STRAIN OF LEFT TRAPEZIUS MUSCLE, INITIAL ENCOUNTER: ICD-10-CM

## 2021-10-18 DIAGNOSIS — M25.511 ACUTE PAIN OF RIGHT SHOULDER: ICD-10-CM

## 2021-10-18 PROCEDURE — 97110 THERAPEUTIC EXERCISES: CPT | Mod: GO | Performed by: OCCUPATIONAL THERAPIST

## 2021-10-18 PROCEDURE — 97165 OT EVAL LOW COMPLEX 30 MIN: CPT | Mod: GO | Performed by: OCCUPATIONAL THERAPIST

## 2021-10-18 NOTE — PROGRESS NOTES
Hand Therapy Initial Evaluation    Current Date:  10/18/2021    Subjective:  Lamar Zimmerman is a 56 year old right hand dominant female.    Diagnosis:   Bilateral hand pain  DOI:  9/15/2021 (therapy referral)    Patient reports symptoms of pain, stiffness/loss of motion, weakness/loss of strength and edema of bilateral hands which occurred due to gradual onset over the past 6 months. Since onset symptoms are gradually getting worse.  Special tests:  x-ray.  Previous treatment: none.  General health as reported by patient is good.  Pertinent medical history includes:Overweight  Medical allergies:see list in EMR.  Surgical history: other: , appendectomy, laparoscopy.  Medication history: CBD.    Occupational Profile Information:  Current occupation is   Currently working in normal job without restrictions  Job Tasks: Lifting, Carrying, Repetitive Tasks  Prior functional level:  independent-shared household chores  Barriers include:none  Mobility: No difficulty  Transportation: drives  Leisure activities/hobbies: knitting    Functional Outcome Measure:  Upper Extremity Functional Index  SCORE:   Column Totals: 78/80  (A lower score indicates greater disability.)    Objective:  Pain Level (Scale 0-10):   10/18/2021   At Rest 0   With Use 2     Pain Description:  Date 10/18/2021   Location Fingers especially index and long finger DIP jts R > L   Pain Quality Stiffness, soreness   Frequency intermittent     Pain is worst  daytime   Exacerbated by  gripping   Relieved by rest   Progression Gradually getting worse     Edema  Mild inflammation of DIP joints especially index and long fingers R > L     Sensation  WNL throughout all nerve distributions; per patient report    ROM  Pain Report:  - none    + mild    ++ moderate    +++ severe   AROM( PROM) 10/18/2021   Finger flexion from Distal Palmar Crease R:1-2 cm  L:0-1 cm     Observation  - none    + mild    ++ moderate    +++ severe    10/18/2021    Dorsal wrist extensor synovitis R:-  L:-   MP joints swelling R:-  L:-   Armando's nodes at PIP joints R:-  L:-   Heberden's nodes at DIP joints R:++ I, L > R, S  L:+ I, L > R, S     Strength   (Measured in pounds)  Pain Report: - none  + mild    ++ moderate    +++ severe    10/18/2021 10/18/2021   Trials Right Left   1  2  3 70-  45+  53+ 72-  68-  55-   Average 56 65     Lat Pinch 10/18/2021 10/18/2021   Trials Right Left   1  2  3 16-  15-  16- 18-  18-  16-   Average 16 17     3 Pt Pinch 10/18/2021 10/18/2021   Trials Right Left   1  2  3 14+  14+  14+ 18-  19-  17-   Average 14 18     Palpation  Slight DIP joint tenderness index and long fingers R > L    Assessment:  Patient presents with symptoms consistent with diagnosis of bilateral hand pain, with conservative intervention.     Patient's limitations or Problem List includes:  Pain, Decreased ROM/motion, Increased edema, Sensory disturbance and Decreased  of bilateral hands which interferes with the patient's ability to perform Recreational Activities and Household Chores as compared to previous level of function.    Rehab Potential:  Good - Return to full activity, some limitations    Patient will benefit from skilled Occupational Therapy to increase ROM, flexibility, overall strength and  strength and decrease pain and edema to return to previous activity level and resume normal daily tasks and to reach their rehab potential.    Barriers to Learning:  No barrier    Communication Issues:  Patient appears to be able to clearly communicate and understand verbal and written communication and follow directions correctly.    Chart Review: Chart Review and Simple history review with patient    Identified Performance Deficits: home establishment and management and leisure activities    Assessment of Occupational Performance:  1-3 Performance Deficits    Clinical Decision Making (Complexity): Low complexity    Treatment Explanation:  The following  has been discussed with the patient:  RX ordered/plan of care  Anticipated outcomes  Possible risks and side effects    Plan:  Frequency:  2 X a month, once daily  Duration:  for 2 months    Treatment Plan:    Modalities:    Paraffin   Therapeutic Exercise:    AROM, PROM, Tendon Gliding, Blocking and Isometrics  Therapeutic Activities:   Functional activities   Orthotic Fabrication:    Static Finger based  Self Care:    Self Care Tasks, Ergonomic Considerations and Joint Protection and Adaptive Equipment Education     Discharge Plan:  Achieve all LTG.  Independent in home treatment program.  Reach maximal therapeutic benefit.    Home Exercise Program:  Joint Protection Education:  Respect pain  Balance rest and activity  Reduce force/effort  Avoid positions of deformity  Use larger joints  Avoid firm grasp or pinch    Exercise:  Warmth for morning stiffness, consider home paraffin  AROM fingers with 3 fist tendon gliding  DIP blocking exercises  Gentle AA/PROM IP flexion  Towel gathering and spreading for functional finger ROM  Isometric  strengthening, avoid pain    Orthosis  None at this time    Next Visit:  See in 2 weeks  Assess response to HEP  Trial paraffin  Consider night extension gutter orthoses as indicated   Review and issue adaptive equipment resources

## 2022-01-12 PROBLEM — M79.641 BILATERAL HAND PAIN: Status: RESOLVED | Noted: 2021-10-18 | Resolved: 2022-01-12

## 2022-01-12 PROBLEM — M79.642 BILATERAL HAND PAIN: Status: RESOLVED | Noted: 2021-10-18 | Resolved: 2022-01-12

## 2022-01-12 NOTE — PROGRESS NOTES
Discharge Summary - Hand Therapy    Patient cancelled appointment 11/8/21 and did not return to therapy.  Assume all goals were met to patient's satisfaction. D/C from hand therapy.

## 2022-02-17 PROBLEM — S76.311A STRAIN OF RIGHT HAMSTRING MUSCLE, INITIAL ENCOUNTER: Status: ACTIVE | Noted: 2017-08-24

## 2022-05-10 ENCOUNTER — TRANSFERRED RECORDS (OUTPATIENT)
Dept: HEALTH INFORMATION MANAGEMENT | Facility: CLINIC | Age: 57
End: 2022-05-10
Payer: COMMERCIAL

## 2022-05-20 ENCOUNTER — VIRTUAL VISIT (OUTPATIENT)
Dept: URGENT CARE | Facility: CLINIC | Age: 57
End: 2022-05-20
Payer: COMMERCIAL

## 2022-05-20 DIAGNOSIS — J01.10 ACUTE FRONTAL SINUSITIS, RECURRENCE NOT SPECIFIED: Primary | ICD-10-CM

## 2022-05-20 PROCEDURE — 99213 OFFICE O/P EST LOW 20 MIN: CPT | Mod: 95 | Performed by: EMERGENCY MEDICINE

## 2022-05-20 RX ORDER — CEFPROZIL 500 MG/1
500 TABLET, FILM COATED ORAL 2 TIMES DAILY
Qty: 14 TABLET | Refills: 0 | Status: SHIPPED | OUTPATIENT
Start: 2022-05-20 | End: 2022-05-27

## 2022-05-20 NOTE — PROGRESS NOTES
Video visit:    Start time: 1:31 PM  Stop time: 1:39 PM  Duration of appointment: 9 minutes    CHIEF COMPLAINT: Possible sinus infection.      HPI: Patient is a 56-year-old female whose had severe URI symptoms for about 8 days and now getting worse for the last several days.  She has been having discolored rhinorrhea as well as productive cough.  She now has had a frontal headache as well as sinus pressure.  She has tested negative for COVID x2.      ROS: See HPI otherwise normal.    Allergies   Allergen Reactions     Erythromycin Rash     Penicillin [Penicillins] Rash     Sulfa Drugs Rash     Voltaren [Nsaids] Rash     Nickel Rash      Current Outpatient Medications   Medication Sig Dispense Refill     cefPROZIL (CEFZIL) 500 MG tablet Take 1 tablet (500 mg) by mouth 2 times daily for 7 days 14 tablet 0         PE: No acute distress on video visit.  Alert.  Nondyspneic appearing.        TREATMENT: None.      ASSESSMENT: Sinusitis has a complication of viral URI.  No severe associated symptoms.      DIAGNOSIS: Acute frontal sinusitis      PLAN: Cefzil as instructed.  Follow-up with PCP or reconnect with virtual appointment if worsening symptoms or no improvement in 1 week.

## 2022-05-23 ENCOUNTER — TELEPHONE (OUTPATIENT)
Dept: FAMILY MEDICINE | Facility: CLINIC | Age: 57
End: 2022-05-23
Payer: COMMERCIAL

## 2022-05-23 DIAGNOSIS — Z12.12 ENCOUNTER FOR COLORECTAL CANCER SCREENING: Primary | ICD-10-CM

## 2022-05-23 DIAGNOSIS — Z12.11 ENCOUNTER FOR COLORECTAL CANCER SCREENING: Primary | ICD-10-CM

## 2022-05-23 NOTE — LETTER
May 23, 2022      aLmar Zimmerman  [address] [place] [id number]              Dear Lamar,      Your healthcare team cares about your health. To provide you with the best care,   we have reviewed your chart and based on our findings, we see that you are due to:     - COLON CANCER SCREENING:  Call or mychart the clinic to schedule your colonoscopy or schedule/ your FIT Test, or Cologuard test    If you have already completed these items, please contact the clinic via phone or   Mychart so your care team can review and update your records. Thank you for   choosing [facility] for your healthcare needs. For any questions,   concerns, or to schedule an appointment please contact the clinic.       Healthy Regards,      Your [facility] Care Team

## 2022-05-23 NOTE — TELEPHONE ENCOUNTER
Patient Quality Outreach    Patient is due for the following:   Colon Cancer Screening -  Colonoscopy-order placed    NEXT STEPS:   Schedule Colonoscopy    Type of outreach:    Sent letter.      Questions for provider review:    None     April Nena

## 2022-05-26 ENCOUNTER — OFFICE VISIT (OUTPATIENT)
Dept: FAMILY MEDICINE | Facility: CLINIC | Age: 57
End: 2022-05-26
Payer: COMMERCIAL

## 2022-05-26 VITALS
HEIGHT: 64 IN | SYSTOLIC BLOOD PRESSURE: 133 MMHG | DIASTOLIC BLOOD PRESSURE: 84 MMHG | BODY MASS INDEX: 41.66 KG/M2 | RESPIRATION RATE: 14 BRPM | HEART RATE: 78 BPM | WEIGHT: 244 LBS | TEMPERATURE: 98.4 F | OXYGEN SATURATION: 97 %

## 2022-05-26 DIAGNOSIS — J01.91 ACUTE RECURRENT SINUSITIS, UNSPECIFIED LOCATION: Primary | ICD-10-CM

## 2022-05-26 DIAGNOSIS — Z20.822 SUSPECTED COVID-19 VIRUS INFECTION: ICD-10-CM

## 2022-05-26 PROCEDURE — U0003 INFECTIOUS AGENT DETECTION BY NUCLEIC ACID (DNA OR RNA); SEVERE ACUTE RESPIRATORY SYNDROME CORONAVIRUS 2 (SARS-COV-2) (CORONAVIRUS DISEASE [COVID-19]), AMPLIFIED PROBE TECHNIQUE, MAKING USE OF HIGH THROUGHPUT TECHNOLOGIES AS DESCRIBED BY CMS-2020-01-R: HCPCS | Performed by: PHYSICIAN ASSISTANT

## 2022-05-26 PROCEDURE — U0005 INFEC AGEN DETEC AMPLI PROBE: HCPCS | Performed by: PHYSICIAN ASSISTANT

## 2022-05-26 PROCEDURE — 99213 OFFICE O/P EST LOW 20 MIN: CPT | Performed by: PHYSICIAN ASSISTANT

## 2022-05-26 RX ORDER — AZITHROMYCIN 250 MG/1
TABLET, FILM COATED ORAL
Qty: 6 TABLET | Refills: 0 | Status: SHIPPED | OUTPATIENT
Start: 2022-05-26 | End: 2022-05-31

## 2022-05-26 NOTE — PROGRESS NOTES
"  Assessment & Plan   Problem List Items Addressed This Visit    None     Visit Diagnoses     Acute recurrent sinusitis, unspecified location    -  Primary    Relevant Medications    azithromycin (ZITHROMAX) 250 MG tablet    Other Relevant Orders    Symptomatic; Yes; 5/12/2022 COVID-19 Virus (Coronavirus) by PCR (Completed)    Suspected COVID-19 virus infection        Relevant Orders    Symptomatic; Yes; 5/12/2022 COVID-19 Virus (Coronavirus) by PCR (Completed)         Impression is likely sinusitis vs viral URI including COVID-19. COVID-19 negative. Appears well and non-toxic and I have low suspicion for impending airway obstruction or respiratory distress.  He will push p.o. fluids, use over-the-counter meds for symptoms, complete a course of azithromycin, and follow-up with us in 2 weeks if not improving or urgent care/the ER if symptoms worsen/change at any time.    Complete history and physical exam as below. AF with normal VS.    DDx and Dx discussed with and explained to the pt to their satisfaction.  All questions were answered at this time. Pt expressed understanding of and agreement with this dx, tx, and plan. No further workup warranted and standard medication warnings given. I have given the patient a list of pertinent indications for re-evaluation. Will go to the Emergency Department if symptoms worsen or new concerning symptoms arise. Patient left in no apparent distress.      BMI:   Estimated body mass index is 41.4 kg/m  as calculated from the following:    Height as of this encounter: 1.635 m (5' 4.37\").    Weight as of this encounter: 110.7 kg (244 lb).     See Patient Instructions    Return in about 2 weeks (around 6/9/2022) for a recheck of your symptoms if not improving, or call 911/go to an ER anytime if worsening.    YASHIRA Medeiros  [facility] MARCELL Newton is a 56 year old who presents for the following health issues     HPI     2 at home Neg Covid per " "patient 5/12/22, 5/16/22. Was in bed sick for two days early in the course of illness. Sleeping sitting up since as she gets a lot of pressure in her head.   Acute Illness  Acute illness concerns: Drainage in back of the throat  Onset/Duration: 5/12/22  Symptoms:  Fever: no  Chills/Sweats: no  Headache (location?): YES- head is plugged  Sinus Pressure: no  Conjunctivitis:  no  Ear Pain: YES- ears are plugged and hears fluid in her right ear.  Rhinorrhea: no  Congestion: no  Sore Throat: no  Cough: YES-productive of yellow sputum  Wheeze: no  Decreased Appetite: no  Nausea: no  Vomiting: no  Diarrhea: no  Dysuria/Freq.: no  Dysuria or Hematuria: no  Fatigue/Achiness: no  Sick/Strep Exposure: no  Therapies tried and outcome: Cefprozil, Nquil, Dayquil, Robbitussin    Review of Systems   Constitutional, HEENT, cardiovascular, pulmonary, gi and gu systems are negative, except as otherwise noted.      Objective    /84   Pulse 78   Temp 98.4  F (36.9  C) (Tympanic)   Resp 14   Ht 1.635 m (5' 4.37\")   Wt 110.7 kg (244 lb)   LMP  (LMP Unknown)   SpO2 97%   BMI 41.40 kg/m    Body mass index is 41.4 kg/m .  Physical Exam  Vitals and nursing note reviewed.   Constitutional:       General: She is not in acute distress.     Appearance: She is not ill-appearing or diaphoretic.   HENT:      Head: Normocephalic and atraumatic.      Comments: Scalp and face non-tender.     Right Ear: Tympanic membrane, ear canal and external ear normal.      Left Ear: Tympanic membrane, ear canal and external ear normal.      Mouth/Throat:      Mouth: Mucous membranes are moist.      Pharynx: Oropharynx is clear.   Eyes:      Conjunctiva/sclera: Conjunctivae normal.   Cardiovascular:      Rate and Rhythm: Normal rate and regular rhythm.      Heart sounds: Normal heart sounds. No murmur heard.    No friction rub. No gallop.   Pulmonary:      Effort: Pulmonary effort is normal. No respiratory distress.      Breath sounds: Normal breath " sounds. No stridor. No wheezing, rhonchi or rales.   Musculoskeletal:      Cervical back: Neck supple. No rigidity.   Lymphadenopathy:      Cervical: No cervical adenopathy.   Skin:     General: Skin is warm and dry.   Neurological:      General: No focal deficit present.      Mental Status: She is alert. Mental status is at baseline.   Psychiatric:         Mood and Affect: Mood normal.         Behavior: Behavior normal.          Results for orders placed or performed in visit on 05/26/22   Symptomatic; Yes; 5/12/2022 COVID-19 Virus (Coronavirus) by PCR Nose     Status: Normal    Specimen: Nose; Swab   Result Value Ref Range    SARS CoV2 PCR Negative Negative, Testing sent to reference lab. Results will be returned via unsolicited result    Narrative    Testing was performed using the darlene SARS-CoV-2 assay on the darlene  [employer] System. This test should be ordered for the detection of  SARS-CoV-2 in individuals who meet SARS-CoV-2 clinical and/or  epidemiological criteria. Test performance is unknown in asymptomatic  patients. This test is for in vitro diagnostic use under the FDA EUA  for laboratories certified under CLIA to perform high and/or moderate  complexity testing. This test has not been FDA cleared or approved. A  negative result does not rule out the presence of PCR inhibitors in  the specimen or target RNA in concentration below the limit of  detection for the assay. The possibility of a false negative should  be considered if the patient's recent exposure or clinical  presentation suggests COVID-19. This test was validated by the [facility]. This  laboratory is certified under the Clinical Laboratory Improvement  Amendments of 1988 (CLIA-88) as qualified to perform high and/or  moderate complexity laboratory testing.

## 2022-05-26 NOTE — PATIENT INSTRUCTIONS
Kedar Newton,    Thank you for allowing [facility] to manage your care.    I am unsure of the cause of your symptoms, but your exam is reassuring. We will see what our workup shows.     If you develop worsening/changing symptoms at any time, please call 911 or go to the emergency department for evaluation.    I sent your prescriptions to your pharmacy. Take a probiotic pill, eat live culture yogurt (Greek yogurt or Activia), drink kefir daily for one week after finishing the antibiotics to encourage growth of good bacteria in your system.    Drink 8-10 glasses of fluid daily to stay well-hydrated.    Please allow 1-2 business days for our office to contact you in regards to your laboratory/radiological studies.  If not done so, I encourage you to login into [employer] [facility] ([email or web address] to review your results as well.     If you have any questions or concerns, please feel free to call us at [phone]    Sincerely,    Jose G Wallis PA-C    Did you know?      You can schedule a video visit for follow-up appointments as well as future appointments for certain conditions.  Please see the below link.     [email or web address]    If you have not already done so,  I encourage you to sign up for [employer] ([email or web address]).  This will allow you to review your results, securely communicate with a provider, and schedule virtual visits as well.

## 2022-05-27 LAB — SARS-COV-2 RNA RESP QL NAA+PROBE: NEGATIVE

## 2022-07-07 ENCOUNTER — OFFICE VISIT (OUTPATIENT)
Dept: FAMILY MEDICINE | Facility: CLINIC | Age: 57
End: 2022-07-07
Payer: COMMERCIAL

## 2022-07-07 ENCOUNTER — HOSPITAL ENCOUNTER (OUTPATIENT)
Dept: ULTRASOUND IMAGING | Facility: CLINIC | Age: 57
Discharge: HOME | End: 2022-07-07
Attending: NURSE PRACTITIONER | Admitting: NURSE PRACTITIONER
Payer: COMMERCIAL

## 2022-07-07 VITALS
SYSTOLIC BLOOD PRESSURE: 132 MMHG | TEMPERATURE: 98.5 F | HEART RATE: 100 BPM | BODY MASS INDEX: 40.93 KG/M2 | RESPIRATION RATE: 16 BRPM | DIASTOLIC BLOOD PRESSURE: 78 MMHG | WEIGHT: 241.2 LBS

## 2022-07-07 DIAGNOSIS — M79.89 RIGHT LEG SWELLING: ICD-10-CM

## 2022-07-07 DIAGNOSIS — M79.661 PAIN OF RIGHT LOWER LEG: ICD-10-CM

## 2022-07-07 DIAGNOSIS — R21 RASH AND NONSPECIFIC SKIN ERUPTION: Primary | ICD-10-CM

## 2022-07-07 PROCEDURE — 99214 OFFICE O/P EST MOD 30 MIN: CPT | Performed by: NURSE PRACTITIONER

## 2022-07-07 PROCEDURE — 93971 EXTREMITY STUDY: CPT | Mod: RT

## 2022-07-07 RX ORDER — CEPHALEXIN 500 MG/1
500 CAPSULE ORAL 3 TIMES DAILY
Qty: 15 CAPSULE | Refills: 0 | Status: SHIPPED | OUTPATIENT
Start: 2022-07-07 | End: 2022-07-12

## 2022-07-07 ASSESSMENT — PAIN SCALES - GENERAL: PAINLEVEL: MILD PAIN (3)

## 2022-07-07 NOTE — PROGRESS NOTES
"  Assessment & Plan     Rash and nonspecific skin eruption     - cephALEXin (KEFLEX) 500 MG capsule  Dispense: 15 capsule; Refill: 0    Pain of right lower leg     - US Lower Extremity Venous Duplex Right STAT to rule out DVT.  - cephALEXin (KEFLEX) 500 MG capsule  Dispense: 15 capsule; Refill: 0    Right leg swelling     - US Lower Extremity Venous Duplex Right     Uncertain etiology of swelling, tenderness and redness.  If US negative for DVT would treat for possible cellulitis given patient is traveling out of town.  Would recommend increasing activity.  Red flags discussed.  Follow-up with PCP next week if not improving.     BMI:   Estimated body mass index is 40.93 kg/m  as calculated from the following:    Height as of 5/26/22: 1.635 m (5' 4.37\").    Weight as of this encounter: 109.4 kg (241 lb 3.2 oz).       See Patient Instructions    Return if symptoms worsen or fail to improve, for Recheck symptoms.    Ankita Wilkins NP  [facility] Hospital[facility]RIANNA [facility] is a 56 year old, presenting for the following health issues:  Derm Problem      History of Present Illness       Reason for visit:  Reddish area on shin, feels like a bruise but its not, aches when i walk but latrice not over exercised it  Symptom onset:  1-3 days ago  Symptoms include:  Soreness in shin with light redness  Symptom intensity:  Mild  Symptom progression:  Staying the same  Had these symptoms before:  No  What makes it worse:  Flexing my foot    She eats 2-3 servings of fruits and vegetables daily.She consumes 0 sweetened beverage(s) daily.She exercises with enough effort to increase her heart rate 10 to 19 minutes per day.  She exercises with enough effort to increase her heart rate 3 or less days per week.   She is taking medications regularly.     Noticed symptoms started 2 days ago.  Right lower leg - worse with flexing foot.  Worse with walking, driving.  Better with rest.  Redness and swelling in shin, " "anterior lower leg.    No recent car rides, or travel.  Has been \"relaxing\" and not exercising since May.       Review of Systems   Constitutional, HEENT, cardiovascular, pulmonary, GI, , musculoskeletal, neuro, skin, endocrine and psych systems are negative, except as otherwise noted.      Objective    /78 (BP Location: Right arm, Patient Position: Sitting, Cuff Size: Adult Large)   Pulse 100   Temp 98.5  F (36.9  C) (Tympanic)   Resp 16   Wt 109.4 kg (241 lb 3.2 oz)   LMP  (LMP Unknown)   BMI 40.93 kg/m    Body mass index is 40.93 kg/m .  Physical Exam   GENERAL: healthy, alert and no distress  RESP: lungs clear to auscultation - no rales, rhonchi or wheezes  CV: regular rate and rhythm, normal S1 S2, no S3 or S4, no murmur, click or rub, no peripheral edema and peripheral pulses strong  MS: no gross musculoskeletal defects noted, no edema  SKIN: Right lower extremity with trace pitting edema shin and down.  Some mild pink area anterior lower leg with mild tenderness anterior on palpation.  Worse with flexion of foot right.               .  ..  "

## 2022-09-21 ASSESSMENT — ENCOUNTER SYMPTOMS
NAUSEA: 0
BREAST MASS: 0
COUGH: 0
HEARTBURN: 0
DYSURIA: 0
CHILLS: 0
SORE THROAT: 0
PARESTHESIAS: 0
PALPITATIONS: 0
HEMATURIA: 0
DIARRHEA: 0
FREQUENCY: 0
HEADACHES: 0
EYE PAIN: 0
HEMATOCHEZIA: 0
SHORTNESS OF BREATH: 0
JOINT SWELLING: 0
WEAKNESS: 0
MYALGIAS: 0
CONSTIPATION: 0
ABDOMINAL PAIN: 0
DIZZINESS: 0
FEVER: 0
NERVOUS/ANXIOUS: 0
ARTHRALGIAS: 1

## 2022-09-21 NOTE — PROGRESS NOTES
SUBJECTIVE:   CC: Lamar is an 56 year old who presents for preventive health visit.       Patient has been advised of split billing requirements and indicates understanding: Yes  Healthy Habits:     Getting at least 3 servings of Calcium per day:  Yes    Bi-annual eye exam:  Yes    Dental care twice a year:  Yes    Sleep apnea or symptoms of sleep apnea:  None    Diet:  Regular (no restrictions)    Frequency of exercise:  1 day/week    Duration of exercise:  15-30 minutes    Taking medications regularly:  Not Applicable    Medication side effects:  Not applicable    PHQ-2 Total Score: 0    Additional concerns today:  No          Today's PHQ-2 Score:   PHQ-2 ( 1999 Pfizer) 9/21/2022   Q1: Little interest or pleasure in doing things 0   Q2: Feeling down, depressed or hopeless 0   PHQ-2 Score 0   PHQ-2 Total Score (12-17 Years)- Positive if 3 or more points; Administer PHQ-A if positive -   Q1: Little interest or pleasure in doing things Not at all   Q2: Feeling down, depressed or hopeless Not at all   PHQ-2 Score 0       Abuse: Current or Past (Physical, Sexual or Emotional) - No  Do you feel safe in your environment? Yes        Social History     Tobacco Use     Smoking status: Former Smoker     Packs/day: 0.00     Years: 0.00     Pack years: 0.00     Smokeless tobacco: Never Used     Tobacco comment: quit 1993   Substance Use Topics     Alcohol use: Yes     Comment: on occasion     If you drink alcohol do you typically have >3 drinks per day or >7 drinks per week? No    Alcohol Use 9/22/2022   Prescreen: >3 drinks/day or >7 drinks/week? -   Prescreen: >3 drinks/day or >7 drinks/week? No     Reviewed orders with patient.  Reviewed health maintenance and updated orders accordingly - Yes  BP Readings from Last 3 Encounters:   09/22/22 136/74   07/07/22 132/78   05/26/22 133/84    Wt Readings from Last 3 Encounters:   09/22/22 109.8 kg (242 lb)   07/07/22 109.4 kg (241 lb 3.2 oz)   05/26/22 110.7 kg (244 lb)                     Breast Cancer Screening:    Breast CA Risk Assessment (FHS-7) 9/14/2021 9/28/2021   Do you have a family history of breast, colon, or ovarian cancer? No / Unknown No / Unknown         Mammogram Screening: Recommended annual mammography  Pertinent mammograms are reviewed under the imaging tab.    History of abnormal Pap smear: NO - age 30-65 PAP every 5 years with negative HPV co-testing recommended  PAP / HPV Latest Ref Rng & Units 9/15/2021 1/19/2015 5/1/2012   PAP   Negative for Intraepithelial Lesion or Malignancy (NILM) - -   PAP (Historical) - - NIL NIL   HPV16 Negative Negative - -   HPV18 Negative Negative - -   HRHPV Negative Negative - -     Reviewed and updated as needed this visit by clinical staff   Tobacco  Allergies  Meds   Med Hx  Surg Hx  Fam Hx  Soc Hx       Ally DeShong CMA    Reviewed and updated as needed this visit by Provider                      Here today for physical.  Is fasting for labs.  Overall, feels like is doing very well.  Does have some left knee pain.  Sees orthopedics.  Has been told that is bone-on-bone in left knee joint.  When goes hiking wears left knee brace and that is very helpful.  Gets injection to left knee about once per year.  Has noticed arthritic changes to hands.  Range of motion is gradually becoming less.  Did go to hand therapy once.  Continues to do hand exercises.    Has a rash to left armpit.  Started after shaving.  Is itchy.  Will burn at times.  Has been using over-the-counter antifungal.  Is not getting much better.    Father with strokes, PGM with strokes, sister with stroke in the last year.  Mother with bicuspid aortic valve.  Brother also has bicuspid aortic valve.  Lamar has never had screening.  Mother had valve replacement within the last year.      Last Pap: 9/21-normal. Never an abnormal  Last mammogram: scheduled 10/22-no family history  Last BMD: N/A  Last Colonoscopy: no family history  Last eye exam: yearly  Last dental exam:  "every 6 mo  Last tetanus vaccine: 2018  Last influenza vaccine: 9/21  Last shingles vaccine: Plans to get at pharmacy   Last pneumonia vaccine: N/A  Last COVID vaccine: Has had both doses  Last COVID booster: done 7/22  Hep C screen: 9/21  HIV screen: 10/18  AAA screen (age 65-78 with smoking hx): N/A  IVD (HTN, Hyperlipid, Smoking): N/A  Lung CA screening (50-77, 20 pk smoking hx) Quit within 15 years: N/A      Review of Systems   Constitutional: Negative for chills and fever.   HENT: Negative for congestion, ear pain, hearing loss and sore throat.    Eyes: Negative for pain and visual disturbance.   Respiratory: Negative for cough and shortness of breath.    Cardiovascular: Negative for chest pain, palpitations and peripheral edema.   Gastrointestinal: Negative for abdominal pain, constipation, diarrhea, heartburn, hematochezia and nausea.   Breasts:  Negative for tenderness, breast mass and discharge.   Genitourinary: Negative for dysuria, frequency, genital sores, hematuria, pelvic pain, urgency, vaginal bleeding and vaginal discharge.   Musculoskeletal: Positive for arthralgias. Negative for joint swelling and myalgias.   Skin: Positive for rash (left arm pit).   Neurological: Negative for dizziness, weakness, headaches and paresthesias.   Psychiatric/Behavioral: Negative for mood changes. The patient is not nervous/anxious.         OBJECTIVE:   /74   Pulse 78   Temp 97.6  F (36.4  C) (Tympanic)   Resp 15   Ht 1.657 m (5' 5.25\")   Wt 109.8 kg (242 lb)   LMP  (LMP Unknown)   SpO2 99%   Breastfeeding No   BMI 39.96 kg/m    Physical Exam  Constitutional:       Appearance: Normal appearance. She is well-developed.   HENT:      Head: Normocephalic and atraumatic.      Right Ear: Tympanic membrane and external ear normal. No middle ear effusion.      Left Ear: Tympanic membrane and external ear normal.  No middle ear effusion.      Nose: No mucosal edema.      Mouth/Throat:      Pharynx: Uvula midline. "   Eyes:      Pupils: Pupils are equal, round, and reactive to light.   Neck:      Thyroid: No thyromegaly.      Vascular: No carotid bruit.   Cardiovascular:      Rate and Rhythm: Normal rate and regular rhythm.      Pulses:           Femoral pulses are 2+ on the right side and 2+ on the left side.     Heart sounds: Normal heart sounds.   Pulmonary:      Effort: Pulmonary effort is normal.      Breath sounds: Normal breath sounds.   Abdominal:      General: Bowel sounds are normal.      Palpations: Abdomen is soft.      Tenderness: There is no abdominal tenderness.   Musculoskeletal:         General: Normal range of motion.      Cervical back: Normal range of motion.      Comments: Armando's nodes noted to hand   Skin:     General: Skin is warm and dry.      Findings: No rash.      Comments: Slightly erythemic, inflamed rash noted to left axilla   Neurological:      Mental Status: She is alert.   Psychiatric:         Behavior: Behavior normal.         ASSESSMENT/PLAN:   Routine general medical examination at a health care facility  Screening guidelines reviewed.   - Basic metabolic panel; Future  - Basic metabolic panel    Family history of bicuspid aortic valve  Order placed, plans to call per self and set up  - Echocardiogram Complete; Future    Screen for colon cancer  - Colonoscopy Screening  Referral; Future    Rash  Encouraged to avoid shaving until rash resolves.  Educated on use of Lotrisone.  - clotrimazole-betamethasone (LOTRISONE) 1-0.05 % external cream; Apply topically 2 times daily    Hyperlipidemia LDL goal <100  We will recheck lipids here today.  Due to family history of stroke and bicuspid valve-would recommend starting low-dose statin medication if lipids continue to be significantly elevated.  - Lipid panel reflex to direct LDL Fasting; Future  - Lipid panel reflex to direct LDL Fasting    Arthralgia, unspecified joint  Continue to follow with Ortho.    Patient has been advised of split  "billing requirements and indicates understanding: Yes    COUNSELING:  Reviewed preventive health counseling, as reflected in patient instructions       Regular exercise       Healthy diet/nutrition       Vision screening       Immunizations    Vaccinated for: Influenza COVID-19.  Plans to go to pharmacy for shingles vaccine.           Colorectal Cancer Screening       Consider Hep C screening for all patients one time for ages 18-79 years       HIV screeninx in teen years, 1x in adult years, and at intervals if high risk    Estimated body mass index is 39.96 kg/m  as calculated from the following:    Height as of this encounter: 1.657 m (5' 5.25\").    Weight as of this encounter: 109.8 kg (242 lb).    Weight management plan: Discussed healthy diet and exercise guidelines    She reports that she has quit smoking. She smoked 0.00 packs per day for 0.00 years. She has never used smokeless tobacco.      Counseling Resources:  ATP IV Guidelines  Pooled Cohorts Equation Calculator  Breast Cancer Risk Calculator  BRCA-Related Cancer Risk Assessment: FHS-7 Tool  FRAX Risk Assessment  ICSI Preventive Guidelines  Dietary Guidelines for Americans, 2010  USDA's MyPlate  ASA Prophylaxis  Lung CA Screening    Miya Dunbar, TOY CNP  [facility]  "

## 2022-09-21 NOTE — PATIENT INSTRUCTIONS
Please go to the pharmacy for your shingles vaccine.     Please call your clinic of choice to schedule this procedure:    [facility]:   [phone]  [facility]:   [phone]  [facility]:  [phone]  [facility]): [phone]  [facility]:   [phone]  [facility]:  [phone]  [facility] [place] [phone]  [facility] [phone]       Preventive Health Recommendations  Female Ages 50 - 64    Yearly exam: See your health care provider every year in order to  Review health changes.   Discuss preventive care.    Review your medicines if your doctor has prescribed any.    Get a Pap test every three years (unless you have an abnormal result and your provider advises testing more often).  If you get Pap tests with HPV test, you only need to test every 5 years, unless you have an abnormal result.   You do not need a Pap test if your uterus was removed (hysterectomy) and you have not had cancer.  You should be tested each year for STDs (sexually transmitted diseases) if you're at risk.   Have a mammogram every 1 to 2 years.  Have a colonoscopy at age 50, or have a yearly FIT test (stool test). These exams screen for colon cancer.    Have a cholesterol test every 5 years, or more often if advised.  Have a diabetes test (fasting glucose) every three years. If you are at risk for diabetes, you should have this test more often.   If you are at risk for osteoporosis (brittle bone disease), think about having a bone density scan (DEXA).    Shots: Get a flu shot each year. Get a tetanus shot every 10 years.    Nutrition:   Eat at least 5 servings of fruits and vegetables each day.  Eat whole-grain bread, whole-wheat pasta and brown rice instead of white grains and rice.  Get adequate Calcium and Vitamin D.     Lifestyle  Exercise at least 150 minutes a week (30 minutes a day, 5 days a week). This will help you control your weight and prevent  disease.  Limit alcohol to one drink per day.  No smoking.   Wear sunscreen to prevent skin cancer.   See your dentist every six months for an exam and cleaning.  See your eye doctor every 1 to 2 years.

## 2022-09-22 ENCOUNTER — OFFICE VISIT (OUTPATIENT)
Dept: FAMILY MEDICINE | Facility: CLINIC | Age: 57
End: 2022-09-22
Payer: COMMERCIAL

## 2022-09-22 VITALS
OXYGEN SATURATION: 99 % | DIASTOLIC BLOOD PRESSURE: 74 MMHG | TEMPERATURE: 97.6 F | BODY MASS INDEX: 40.32 KG/M2 | RESPIRATION RATE: 15 BRPM | HEIGHT: 65 IN | SYSTOLIC BLOOD PRESSURE: 136 MMHG | HEART RATE: 78 BPM | WEIGHT: 242 LBS

## 2022-09-22 DIAGNOSIS — Z12.11 SCREEN FOR COLON CANCER: ICD-10-CM

## 2022-09-22 DIAGNOSIS — Z82.79 FAMILY HISTORY OF BICUSPID AORTIC VALVE: ICD-10-CM

## 2022-09-22 DIAGNOSIS — M25.50 ARTHRALGIA, UNSPECIFIED JOINT: ICD-10-CM

## 2022-09-22 DIAGNOSIS — E78.5 HYPERLIPIDEMIA LDL GOAL <100: ICD-10-CM

## 2022-09-22 DIAGNOSIS — Z00.00 ROUTINE GENERAL MEDICAL EXAMINATION AT A HEALTH CARE FACILITY: Primary | ICD-10-CM

## 2022-09-22 DIAGNOSIS — E78.5 HYPERLIPIDEMIA LDL GOAL <100: Primary | ICD-10-CM

## 2022-09-22 DIAGNOSIS — R21 RASH: ICD-10-CM

## 2022-09-22 LAB
ANION GAP SERPL CALCULATED.3IONS-SCNC: 6 MMOL/L (ref 3–14)
BUN SERPL-MCNC: 16 MG/DL (ref 7–30)
CALCIUM SERPL-MCNC: 9.3 MG/DL (ref 8.5–10.1)
CHLORIDE BLD-SCNC: 106 MMOL/L (ref 94–109)
CHOLEST SERPL-MCNC: 274 MG/DL
CO2 SERPL-SCNC: 28 MMOL/L (ref 20–32)
CREAT SERPL-MCNC: 0.73 MG/DL (ref 0.52–1.04)
FASTING STATUS PATIENT QL REPORTED: YES
GFR SERPL CREATININE-BSD FRML MDRD: >90 ML/MIN/1.73M2
GLUCOSE BLD-MCNC: 101 MG/DL (ref 70–99)
HDLC SERPL-MCNC: 65 MG/DL
LDLC SERPL CALC-MCNC: 188 MG/DL
NONHDLC SERPL-MCNC: 209 MG/DL
POTASSIUM BLD-SCNC: 4 MMOL/L (ref 3.4–5.3)
SODIUM SERPL-SCNC: 140 MMOL/L (ref 133–144)
TRIGL SERPL-MCNC: 105 MG/DL

## 2022-09-22 PROCEDURE — 80061 LIPID PANEL: CPT | Performed by: NURSE PRACTITIONER

## 2022-09-22 PROCEDURE — 91313 COVID-19,PF,MODERNA BIVALENT: CPT | Performed by: NURSE PRACTITIONER

## 2022-09-22 PROCEDURE — 99213 OFFICE O/P EST LOW 20 MIN: CPT | Mod: 25 | Performed by: NURSE PRACTITIONER

## 2022-09-22 PROCEDURE — 90682 RIV4 VACC RECOMBINANT DNA IM: CPT | Performed by: NURSE PRACTITIONER

## 2022-09-22 PROCEDURE — 80048 BASIC METABOLIC PNL TOTAL CA: CPT | Performed by: NURSE PRACTITIONER

## 2022-09-22 PROCEDURE — 90471 IMMUNIZATION ADMIN: CPT | Performed by: NURSE PRACTITIONER

## 2022-09-22 PROCEDURE — 99396 PREV VISIT EST AGE 40-64: CPT | Mod: 25 | Performed by: NURSE PRACTITIONER

## 2022-09-22 PROCEDURE — 36415 COLL VENOUS BLD VENIPUNCTURE: CPT | Performed by: NURSE PRACTITIONER

## 2022-09-22 PROCEDURE — 0134A COVID-19,PF,MODERNA BIVALENT: CPT | Performed by: NURSE PRACTITIONER

## 2022-09-22 RX ORDER — CLOTRIMAZOLE AND BETAMETHASONE DIPROPIONATE 10; .64 MG/G; MG/G
CREAM TOPICAL 2 TIMES DAILY
Qty: 45 G | Refills: 1 | Status: SHIPPED | OUTPATIENT
Start: 2022-09-22 | End: 2022-10-26

## 2022-09-22 RX ORDER — ROSUVASTATIN CALCIUM 20 MG/1
20 TABLET, COATED ORAL DAILY
Qty: 90 TABLET | Refills: 3 | Status: SHIPPED | OUTPATIENT
Start: 2022-09-22 | End: 2022-10-03

## 2022-09-22 ASSESSMENT — ENCOUNTER SYMPTOMS
HEADACHES: 0
CHILLS: 0
SHORTNESS OF BREATH: 0
FEVER: 0
HEMATURIA: 0
DIARRHEA: 0
NAUSEA: 0
PARESTHESIAS: 0
BREAST MASS: 0
FREQUENCY: 0
HEMATOCHEZIA: 0
ABDOMINAL PAIN: 0
ARTHRALGIAS: 1
EYE PAIN: 0
SORE THROAT: 0
CONSTIPATION: 0
PALPITATIONS: 0
COUGH: 0
HEARTBURN: 0
NERVOUS/ANXIOUS: 0
DYSURIA: 0
JOINT SWELLING: 0
WEAKNESS: 0
MYALGIAS: 0
DIZZINESS: 0

## 2022-09-22 ASSESSMENT — PAIN SCALES - GENERAL: PAINLEVEL: NO PAIN (0)

## 2022-10-03 DIAGNOSIS — E78.5 HYPERLIPIDEMIA LDL GOAL <100: ICD-10-CM

## 2022-10-03 RX ORDER — ROSUVASTATIN CALCIUM 20 MG/1
10 TABLET, COATED ORAL DAILY
Qty: 90 TABLET | Refills: 3
Start: 2022-10-03 | End: 2022-10-23

## 2022-10-20 ENCOUNTER — HOSPITAL ENCOUNTER (OUTPATIENT)
Dept: MAMMOGRAPHY | Facility: CLINIC | Age: 57
Discharge: HOME | End: 2022-10-20
Attending: NURSE PRACTITIONER
Payer: COMMERCIAL

## 2022-10-20 ENCOUNTER — TRANSFERRED RECORDS (OUTPATIENT)
Dept: HEALTH INFORMATION MANAGEMENT | Facility: CLINIC | Age: 57
End: 2022-10-20

## 2022-10-20 ENCOUNTER — HOSPITAL ENCOUNTER (OUTPATIENT)
Dept: CARDIOLOGY | Facility: CLINIC | Age: 57
Discharge: HOME | End: 2022-10-20
Attending: NURSE PRACTITIONER
Payer: COMMERCIAL

## 2022-10-20 DIAGNOSIS — Z12.31 VISIT FOR SCREENING MAMMOGRAM: ICD-10-CM

## 2022-10-20 DIAGNOSIS — Z82.79 FAMILY HISTORY OF BICUSPID AORTIC VALVE: ICD-10-CM

## 2022-10-20 LAB — LVEF ECHO: NORMAL

## 2022-10-20 PROCEDURE — 93306 TTE W/DOPPLER COMPLETE: CPT

## 2022-10-20 PROCEDURE — 93306 TTE W/DOPPLER COMPLETE: CPT | Mod: 26 | Performed by: INTERNAL MEDICINE

## 2022-10-20 PROCEDURE — 77067 SCR MAMMO BI INCL CAD: CPT

## 2022-10-26 ENCOUNTER — E-VISIT (OUTPATIENT)
Dept: URGENT CARE | Facility: CLINIC | Age: 57
End: 2022-10-26
Payer: COMMERCIAL

## 2022-10-26 ENCOUNTER — TELEPHONE (OUTPATIENT)
Dept: NURSING | Facility: CLINIC | Age: 57
End: 2022-10-26

## 2022-10-26 DIAGNOSIS — J01.90 ACUTE SINUSITIS WITH SYMPTOMS > 10 DAYS: Primary | ICD-10-CM

## 2022-10-26 DIAGNOSIS — J01.90 ACUTE NON-RECURRENT SINUSITIS, UNSPECIFIED LOCATION: Primary | ICD-10-CM

## 2022-10-26 PROCEDURE — 99207 PR NO BILLABLE SERVICE THIS VISIT: CPT | Performed by: NURSE PRACTITIONER

## 2022-10-26 RX ORDER — LEVOFLOXACIN 500 MG/1
500 TABLET, FILM COATED ORAL DAILY
Qty: 5 TABLET | Refills: 0 | Status: SHIPPED | OUTPATIENT
Start: 2022-10-26 | End: 2022-10-31

## 2022-10-26 NOTE — PATIENT INSTRUCTIONS
Sinusitis (Antibiotic Treatment)    The sinuses are air-filled spaces within the bones of the face. They connect to the inside of the nose. Sinusitis is an inflammation of the tissue that lines the sinuses. Sinusitis can occur during a cold. It can also happen due to allergies to pollens and other particles in the air. Sinusitis can cause symptoms of sinus congestion and a feeling of fullness. A sinus infection causes fever, headache, and facial pain. There is often green or yellow fluid draining from the nose or into the back of the throat (post-nasal drip). You have been given antibiotics to treat this condition.   Home care    Take the full course of antibiotics as instructed. Don't stop taking them, even when you feel better.    Drink plenty of water, hot tea, and other liquids as directed by the healthcare provider. This may help thin nasal mucus. It also may help your sinuses drain fluids.    Heat may help soothe painful areas of your face. Use a towel soaked in hot water. Or,  the shower and direct the warm spray onto your face. Using a vaporizer along with a menthol rub at night may also help soothe symptoms.     An expectorant with guaifenesin may help thin nasal mucus and help your sinuses drain fluids. Talk with your provider or pharmacists before taking an over-the-counter (OTC) medicine if you have any questions about it or its side effects..    You can use an OTC decongestant, unless a similar medicine was prescribed to you. Nasal sprays work the fastest. Use one that contains phenylephrine or oxymetazoline. First blow your nose gently. Then use the spray. Don't use these medicines more often than directed on the label. If you do, your symptoms may get worse. You may also take pills that contain pseudoephedrine. Don t use products that combine multiple medicines. This is because side effects may be increased. Read labels. You can also ask the pharmacist for help. (People with high blood  pressure should not use decongestants. They can raise blood pressure.) Talk with your provider or pharmacist if you have any questions about the medicine..    OTC antihistamines may help if allergies contributed to your sinusitis. Talk with your provider or pharmacist if you have any questions about the medicine..    Don't use nasal rinses or irrigation during an acute sinus infection, unless your healthcare provider tells you to. Rinsing may spread the infection to other areas in your sinuses.    Use acetaminophen or ibuprofen to control pain, unless another pain medicine was prescribed to you. If you have chronic liver or kidney disease or ever had a stomach ulcer, talk with your healthcare provider before using these medicines. Never give aspirin to anyone under age 18 who is ill with a fever. It may cause severe liver damage.    Don't smoke. This can make symptoms worse.    Follow-up care  Follow up with your healthcare provider, or as advised.   When to seek medical advice  Call your healthcare provider if any of these occur:     Facial pain or headache that gets worse    Stiff neck    Unusual drowsiness or confusion    Swelling of your forehead or eyelids    Symptoms don't go away in 10 days    Vision problems, such as blurred or double vision    Fever of 100.4 F (38 C) or higher, or as directed by your healthcare provider  Call 911  Call 911 if any of these occur:     Seizure    Trouble breathing    Feeling dizzy or faint    Fingernails, skin or lips look blue, purple , or gray  Prevention  Here are steps you can take to help prevent an infection:     Keep good hand washing habits.    Don t have close contact with people who have sore throats, colds, or other upper respiratory infections.    Don t smoke, and stay away from secondhand smoke.    Stay up to date with of your vaccines.  [employer] last reviewed this educational content on 12/1/2019 2000-2021 [employer]. All rights reserved. This  information is not intended as a substitute for professional medical care. Always follow your healthcare professional's instructions.        Dear Lamar Zimmerman    After reviewing your responses, I've been able to diagnose you with?a sinus infection caused by bacteria.?     Based on your responses and diagnosis, I have prescribed doxycycline to treat your symptoms. I have sent this to your pharmacy.?     It is also important to stay well hydrated, get lots of rest and take over-the-counter decongestants,?tylenol?or ibuprofen if you?are able to?take those medications per your primary care provider to help relieve discomfort.?     It is important that you take?all of?your prescribed medication even if your symptoms are improving after a few doses.? Taking?all of?your medicine helps prevent the symptoms from returning.?     If your symptoms worsen, you develop severe headache, vomiting, high fever (>102), or are not improving in 7 days, please contact your primary care provider for an appointment or visit any of our convenient Walk-in Care or Urgent Care Centers to be seen which can be found on our website?here.?     Thanks again for choosing?us?as your health care partner,?   ?  Bean Mercado NP?

## 2022-10-26 NOTE — TELEPHONE ENCOUNTER
Pt had an E-visit today and the wrong medication (Doxycycline) was ordered. Pt reports it makes her whole body feel weird, and she needs to get this listed as a medication she cant take. Pt is calling in to get her prescription changed to Zithromax.    Please call Lamar at [phone] to advise.       Fam Ibarra RN on 10/26/2022 at 6:04 PM

## 2022-10-27 NOTE — TELEPHONE ENCOUNTER
Patient called the because she thought she may want to switch to Azithromycin. However, with further discussion she has decided to give the Levaquin a try first.     Alexandrea Mohr RN BSN  [facility]

## 2022-11-02 ENCOUNTER — MYC MEDICAL ADVICE (OUTPATIENT)
Dept: FAMILY MEDICINE | Facility: CLINIC | Age: 57
End: 2022-11-02

## 2022-11-02 DIAGNOSIS — J32.9 SINUSITIS, UNSPECIFIED CHRONICITY, UNSPECIFIED LOCATION: Primary | ICD-10-CM

## 2022-11-02 RX ORDER — LEVOFLOXACIN 500 MG/1
500 TABLET, FILM COATED ORAL DAILY
Qty: 5 TABLET | Refills: 0 | Status: SHIPPED | OUTPATIENT
Start: 2022-11-02 | End: 2023-05-02

## 2022-11-02 NOTE — TELEPHONE ENCOUNTER
I sent an additional 5 days of Levaquin.  If no improvement will need in person appointment.    Miya ENGELC

## 2022-11-03 NOTE — TELEPHONE ENCOUNTER
See my chart message; spoke with patient and she picked up antibiotic prescribed by Miya Dunbar.  Done.  Chaya Ann RN

## 2022-12-19 ENCOUNTER — VIRTUAL VISIT (OUTPATIENT)
Dept: URGENT CARE | Facility: CLINIC | Age: 57
End: 2022-12-19
Payer: COMMERCIAL

## 2022-12-19 DIAGNOSIS — J02.9 SORE THROAT: Primary | ICD-10-CM

## 2022-12-19 PROCEDURE — 99213 OFFICE O/P EST LOW 20 MIN: CPT | Mod: GT | Performed by: NURSE PRACTITIONER

## 2022-12-19 RX ORDER — AZITHROMYCIN 250 MG/1
TABLET, FILM COATED ORAL
Qty: 6 TABLET | Refills: 0 | Status: SHIPPED | OUTPATIENT
Start: 2022-12-19 | End: 2022-12-24

## 2022-12-19 NOTE — PROGRESS NOTES
"Lamar is a 57 year old who is being evaluated via a billable video visit.      How would you like to obtain your AVS? MyChart  If the video visit is dropped, the invitation should be resent by: Text to cell phone: [phone]  Will anyone else be joining your video visit? No          Assessment & Plan     Sore throat      PLAN:  Treated based on symptoms and close exposure  Zpak given (previously tolerated)  If doesn't see improvement in 72 hours or new worsening will be seen in clinic for exam labs and swabs/bloodwork as necessary.         Alexandra Newman, TOY CNP  [facility]    Subjective   Lamar is a 57 year old presenting for the following health issues:  Sore throat    HPI     Friday morning started with aches chills lasted 2 days  Yesterday body felt better but throat \"burning\" tonsils large red (appears like gray scabs on one side?)  Swallowing razorblades. 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   No hx mono no uvula swelling  No injury to throat  Taken 2 home covid tests.   No difficulty breathing or swallowing        Objective           Vitals:  No vitals were obtained today due to virtual visit.    Physical Exam   GENERAL: alert and no distress  EYES: Eyes grossly normal to inspection.  No discharge or erythema, or obvious scleral/conjunctival abnormalities.  RESP: No audible wheeze, cough, or visible cyanosis.  No visible retractions or increased work of breathing.    SKIN: Visible skin clear. No significant rash, abnormal pigmentation or lesions.  NEURO: Cranial nerves grossly intact.  Mentation and speech appropriate for age.  PSYCH: Mentation appears normal, affect normal/bright, judgement and insight intact, normal speech and appearance well-groomed.      Video-Visit Details    Video Start Time: 3:00 PM    Type of service:  Video Visit    Video End Time:3:15 " PM    Originating Location (pt. Location): Home        Distant Location (provider location):  Off-site    Platform used for Video Visit: Lorin

## 2023-05-02 ENCOUNTER — VIRTUAL VISIT (OUTPATIENT)
Dept: FAMILY MEDICINE | Facility: CLINIC | Age: 58
End: 2023-05-02
Payer: COMMERCIAL

## 2023-05-02 DIAGNOSIS — H10.13 ALLERGIC CONJUNCTIVITIS, BILATERAL: Primary | ICD-10-CM

## 2023-05-02 PROCEDURE — 99213 OFFICE O/P EST LOW 20 MIN: CPT | Mod: VID | Performed by: NURSE PRACTITIONER

## 2023-05-02 NOTE — PROGRESS NOTES
"Lamar is a 57 year old who is being evaluated via a billable video visit.      How would you like to obtain your AVS? MyChart  If the video visit is dropped, the invitation should be resent by: Text to cell phone: [phone]  Will anyone else be joining your video visit? No          Assessment & Plan     (H10.13) Allergic conjunctivitis, bilateral  (primary encounter diagnosis)  Comment: try OTC eye drops    Plan: likely COVID exposure over 7 days ago, continue to mask if out of house for 3 more days as symptoms have resolved             BMI:   Estimated body mass index is 39.96 kg/m  as calculated from the following:    Height as of 9/22/22: 1.657 m (5' 5.25\").    Weight as of 9/22/22: 109.8 kg (242 lb).           Linda Benavides NP  [facility]    Subjective   Lamar is a 57 year old, presenting for the following health issues: patient thinks that she may have pink eye, she has a student with pink eye, bilateral eye itching, puffiness along the lash line, feels like there is a \"film\" over the eyes, denies redness. ITCHINESS is the main symptoms. She has had seasonal allergies mildly in the past.  Advised OTC allergy eye drops     also believes that she is recovering from COVID - no testing was done, started 4/14/23 with body aches, low grade fever, and sinus issues, her daughter is currently sick with COVID x7 days.  Lamar had a negative COVID test about 7 days ago with onset of child's test. She is fully vaccinated,       Conjunctivitis and Suspected Covid        5/2/2023     7:18 AM   Additional Questions   Roomed by Melani Garcia   Accompanied by Self     Conjunctivitis    History of Present Illness       Reason for visit:  Pink eye  Symptom onset:  3-7 days ago  Symptoms include:  Very itchy eyes, puffy eyes, eyes feel gloopy  Symptom intensity:  Moderate  Symptom progression:  Staying the same  Had these symptoms before:  No  What makes it worse:  No  What makes it better:  Washcloth " over eyes    She eats 2-3 servings of fruits and vegetables daily.She consumes 0 sweetened beverage(s) daily.She exercises with enough effort to increase her heart rate 9 or less minutes per day.  She exercises with enough effort to increase her heart rate 3 or less days per week.   She is taking medications regularly.             Review of Systems         Objective           Vitals:  No vitals were obtained today due to virtual visit.    Physical Exam   GENERAL: Healthy, alert and no distress  EYES: Eyes grossly normal to inspection.  No discharge or erythema, or obvious scleral/conjunctival abnormalities.  RESP: No audible wheeze, cough, or visible cyanosis.  No visible retractions or increased work of breathing.    SKIN: Visible skin clear. No significant rash, abnormal pigmentation or lesions.  NEURO: Cranial nerves grossly intact.  Mentation and speech appropriate for age.  PSYCH: Mentation appears normal, affect normal/bright, judgement and insight intact, normal speech and appearance well-groomed.                Video-Visit Details    Type of service:  Video Visit     Originating Location (pt. Location): Home  Distant Location (provider location):  On-site  Platform used for Video Visit: Lorin

## 2023-07-17 ENCOUNTER — TELEPHONE (OUTPATIENT)
Dept: ORTHOPEDICS | Facility: CLINIC | Age: 58
End: 2023-07-17
Payer: COMMERCIAL

## 2023-07-17 DIAGNOSIS — M17.12 PRIMARY OSTEOARTHRITIS OF LEFT KNEE: Primary | ICD-10-CM

## 2023-07-17 NOTE — TELEPHONE ENCOUNTER
Patient [facility] viscosupplement[facility] injection vs steroid injection of left knee.        Steroid  injection last completed 10/20/22 by Dr Otto @ [facility].  Unknown relief as injection was completed outside of system.     Patient has failed trial of OTC NSAIDs/Pain Medication (ibuprofen, tylenol, naproxen,...):  Yes       Patient has completed trial of physical therapy: Unknown    Prior authorization referral for SynviscOne injection pended.    Please advise    Terry Dennison ATC

## 2023-07-21 NOTE — TELEPHONE ENCOUNTER
Reviewed patient's chart - no contraindications for preferred medications.  Updated orders for Monovisc placed.    Terry Dennison, ATC

## 2023-07-21 NOTE — TELEPHONE ENCOUNTER
Hello,     We received a letter from [employer] requesting additional information. [employer] prefers Euflexxa, Monovisc or orthovisc. They need to know if there are any contradictions, intolerance or ineffective response to any of these? If not can you please place a new order for a preferred brand?     Thanks,   Shannon

## 2023-10-22 ASSESSMENT — ENCOUNTER SYMPTOMS
FEVER: 0
HEADACHES: 0
EYE PAIN: 0
JOINT SWELLING: 0
CHILLS: 0
ARTHRALGIAS: 0
DYSURIA: 0
DIZZINESS: 0
SORE THROAT: 0
DIARRHEA: 0
MYALGIAS: 0
PARESTHESIAS: 0
BREAST MASS: 0
FREQUENCY: 0
PALPITATIONS: 0
SHORTNESS OF BREATH: 0
NERVOUS/ANXIOUS: 0
HEARTBURN: 0
ABDOMINAL PAIN: 0
WEAKNESS: 0
CONSTIPATION: 0
HEMATOCHEZIA: 0
HEMATURIA: 0
COUGH: 1
NAUSEA: 0

## 2023-10-23 ENCOUNTER — OFFICE VISIT (OUTPATIENT)
Dept: FAMILY MEDICINE | Facility: CLINIC | Age: 58
End: 2023-10-23
Payer: COMMERCIAL

## 2023-10-23 VITALS
OXYGEN SATURATION: 97 % | RESPIRATION RATE: 12 BRPM | DIASTOLIC BLOOD PRESSURE: 72 MMHG | WEIGHT: 250 LBS | BODY MASS INDEX: 41.65 KG/M2 | HEART RATE: 80 BPM | HEIGHT: 65 IN | TEMPERATURE: 98.1 F | SYSTOLIC BLOOD PRESSURE: 136 MMHG

## 2023-10-23 DIAGNOSIS — Z12.11 SCREEN FOR COLON CANCER: ICD-10-CM

## 2023-10-23 DIAGNOSIS — E78.5 HYPERLIPIDEMIA LDL GOAL <100: ICD-10-CM

## 2023-10-23 DIAGNOSIS — Z00.00 ROUTINE GENERAL MEDICAL EXAMINATION AT A HEALTH CARE FACILITY: Primary | ICD-10-CM

## 2023-10-23 LAB
ALBUMIN SERPL BCG-MCNC: 4.2 G/DL (ref 3.5–5.2)
ALP SERPL-CCNC: 43 U/L (ref 35–104)
ALT SERPL W P-5'-P-CCNC: 20 U/L (ref 0–50)
ANION GAP SERPL CALCULATED.3IONS-SCNC: 10 MMOL/L (ref 7–15)
AST SERPL W P-5'-P-CCNC: 30 U/L (ref 0–45)
BILIRUB SERPL-MCNC: 0.5 MG/DL
BUN SERPL-MCNC: 15.1 MG/DL (ref 6–20)
CALCIUM SERPL-MCNC: 9.3 MG/DL (ref 8.6–10)
CHLORIDE SERPL-SCNC: 105 MMOL/L (ref 98–107)
CHOLEST SERPL-MCNC: 287 MG/DL
CREAT SERPL-MCNC: 0.67 MG/DL (ref 0.51–0.95)
DEPRECATED HCO3 PLAS-SCNC: 26 MMOL/L (ref 22–29)
EGFRCR SERPLBLD CKD-EPI 2021: >90 ML/MIN/1.73M2
GLUCOSE SERPL-MCNC: 97 MG/DL (ref 70–99)
HBA1C MFR BLD: 5.6 % (ref 0–5.6)
HDLC SERPL-MCNC: 65 MG/DL
LDLC SERPL CALC-MCNC: 204 MG/DL
NONHDLC SERPL-MCNC: 222 MG/DL
POTASSIUM SERPL-SCNC: 4.2 MMOL/L (ref 3.4–5.3)
PROT SERPL-MCNC: 7 G/DL (ref 6.4–8.3)
SODIUM SERPL-SCNC: 141 MMOL/L (ref 135–145)
TRIGL SERPL-MCNC: 90 MG/DL

## 2023-10-23 PROCEDURE — 91320 SARSCV2 VAC 30MCG TRS-SUC IM: CPT | Performed by: NURSE PRACTITIONER

## 2023-10-23 PROCEDURE — 99213 OFFICE O/P EST LOW 20 MIN: CPT | Mod: 25 | Performed by: NURSE PRACTITIONER

## 2023-10-23 PROCEDURE — 36415 COLL VENOUS BLD VENIPUNCTURE: CPT | Performed by: NURSE PRACTITIONER

## 2023-10-23 PROCEDURE — 80053 COMPREHEN METABOLIC PANEL: CPT | Performed by: NURSE PRACTITIONER

## 2023-10-23 PROCEDURE — 90471 IMMUNIZATION ADMIN: CPT | Performed by: NURSE PRACTITIONER

## 2023-10-23 PROCEDURE — 90480 ADMN SARSCOV2 VAC 1/ONLY CMP: CPT | Performed by: NURSE PRACTITIONER

## 2023-10-23 PROCEDURE — 90686 IIV4 VACC NO PRSV 0.5 ML IM: CPT | Performed by: NURSE PRACTITIONER

## 2023-10-23 PROCEDURE — 99396 PREV VISIT EST AGE 40-64: CPT | Mod: 25 | Performed by: NURSE PRACTITIONER

## 2023-10-23 PROCEDURE — 83036 HEMOGLOBIN GLYCOSYLATED A1C: CPT | Performed by: NURSE PRACTITIONER

## 2023-10-23 PROCEDURE — 80061 LIPID PANEL: CPT | Performed by: NURSE PRACTITIONER

## 2023-10-23 RX ORDER — ROSUVASTATIN CALCIUM 10 MG/1
10 TABLET, COATED ORAL EVERY OTHER DAY
Qty: 30 TABLET | Refills: 1 | Status: SHIPPED | OUTPATIENT
Start: 2023-10-23 | End: 2024-04-28

## 2023-10-23 ASSESSMENT — ENCOUNTER SYMPTOMS
EYE PAIN: 0
NUMBNESS: 0
PARESTHESIAS: 0
FEVER: 0
WHEEZING: 0
CONSTIPATION: 0
HEMATOCHEZIA: 0
DIZZINESS: 0
DIARRHEA: 0
JOINT SWELLING: 0
CHEST TIGHTNESS: 0
WEAKNESS: 0
CHILLS: 0
COUGH: 1
HEARTBURN: 0
ABDOMINAL DISTENTION: 0
MYALGIAS: 0
LIGHT-HEADEDNESS: 0
SLEEP DISTURBANCE: 0
FATIGUE: 0
ABDOMINAL PAIN: 0
NERVOUS/ANXIOUS: 0
POLYPHAGIA: 0
HEMATURIA: 0
SORE THROAT: 0
ACTIVITY CHANGE: 0
ARTHRALGIAS: 0
BREAST MASS: 0
PALPITATIONS: 0
HEADACHES: 0
DYSURIA: 0
SHORTNESS OF BREATH: 0
POLYDIPSIA: 0
FREQUENCY: 0
DYSPHORIC MOOD: 0
RHINORRHEA: 0
VOMITING: 0
NAUSEA: 0
DIFFICULTY URINATING: 0

## 2023-10-23 ASSESSMENT — PAIN SCALES - GENERAL: PAINLEVEL: NO PAIN (0)

## 2023-10-23 NOTE — PATIENT INSTRUCTIONS
Please go to the pharmacy to receive your shingles vaccine, Shingrix.  It is a series of 2.  You should receive the first vaccine and then get the second vaccine in at least 2 months.  If more time lapses that is okay.  Do see a reaction similar to tetanus where your arm gets red, stiff sometimes warm to touch.  After the second dose it is very common to feel tired and rundown for 24 hours or so.    Please send a my chart message in 3-4 weeks with update on how you are doing with the Crestor/rosuvastatin.         Preventive Health Recommendations  Female Ages 50 - 64    Yearly exam: See your health care provider every year in order to  Review health changes.   Discuss preventive care.    Review your medicines if your doctor has prescribed any.    Get a Pap test every three years (unless you have an abnormal result and your provider advises testing more often).  If you get Pap tests with HPV test, you only need to test every 5 years, unless you have an abnormal result.   You do not need a Pap test if your uterus was removed (hysterectomy) and you have not had cancer.  You should be tested each year for STDs (sexually transmitted diseases) if you're at risk.   Have a mammogram every 1 to 2 years.  Have a colonoscopy at age 45, or have a yearly FIT test (stool test). These exams screen for colon cancer.    Have a cholesterol test every 5 years, or more often if advised.  Have a diabetes test (fasting glucose) every three years. If you are at risk for diabetes, you should have this test more often.   If you are at risk for osteoporosis (brittle bone disease), think about having a bone density scan (DEXA).    Shots: Get a flu shot each year. Get a tetanus shot every 10 years.    Nutrition:   Eat at least 5 servings of fruits and vegetables each day.  Eat whole-grain bread, whole-wheat pasta and brown rice instead of white grains and rice.  Get adequate Calcium and Vitamin D.     Lifestyle  Exercise at least 150 minutes a  week (30 minutes a day, 5 days a week). This will help you control your weight and prevent disease.  Limit alcohol to one drink per day.  No smoking.   Wear sunscreen to prevent skin cancer.   See your dentist every six months for an exam and cleaning.  See your eye doctor every 1 to 2 years.

## 2023-10-23 NOTE — PROGRESS NOTES
SUBJECTIVE:   CC: Lamar is an 58 year old who presents for preventive health visit.       10/23/2023     6:59 AM   Additional Questions   Roomed by Luis Antonioy   Accompanied by Self         10/23/2023     6:59 AM   Patient Reported Additional Medications   Patient reports taking the following new medications NA       Healthy Habits:     Getting at least 3 servings of Calcium per day:  NO    Bi-annual eye exam:  Yes    Dental care twice a year:  Yes    Sleep apnea or symptoms of sleep apnea:  None    Diet:  Regular (no restrictions)    Frequency of exercise:  1 day/week    Duration of exercise:  Less than 15 minutes    Taking medications regularly:  Yes    Medication side effects:  Not applicable    Additional concerns today:  No        Social History     Tobacco Use    Smoking status: Former     Packs/day: 0.00     Years: 0.00     Additional pack years: 0.00     Total pack years: 0.00     Types: Cigarettes     Passive exposure: Past    Smokeless tobacco: Never    Tobacco comments:     quit 1993   Substance Use Topics    Alcohol use: Yes     Comment: on occasion           10/22/2023     7:16 PM   Alcohol Use   Prescreen: >3 drinks/day or >7 drinks/week? No       Reviewed orders with patient.  Reviewed health maintenance and updated orders accordingly - Yes      Breast Cancer Screenin/14/2021     7:11 AM 2021     2:45 PM   Breast CA Risk Assessment (FHS-7)   Do you have a family history of breast, colon, or ovarian cancer? No / Unknown No / Unknown       Mammogram Screening: Recommended mammography every 1-2 years with patient discussion and risk factor consideration  Pertinent mammograms are reviewed under the imaging tab.    History of abnormal Pap smear: NO - age 30-65 PAP every 5 years with negative HPV co-testing recommended      Latest Ref Rng & Units 9/15/2021     7:54 AM 2015    12:00 AM 2012    10:12 AM   PAP / HPV   PAP  Negative for Intraepithelial Lesion or Malignancy (NILM)      PAP  (Historical)   NIL  NIL    HPV 16 DNA Negative Negative      HPV 18 DNA Negative Negative      Other HR HPV Negative Negative        Reviewed and updated as needed this visit by clinical staff   Tobacco  Allergies  Meds              Reviewed and updated as needed this visit by Provider                     Here today for physical.  Is fasting today for labs. Has been taking care of others this last year-sister, LILLIANA and mother.  For the last month sister has been upset with her still has not been needing to care for her.  Has been an eye opener about how much time and effort was putting in.  Plans to start setting some boundaries.  Wants to start taking care of self more.    History of hyperlipidemia.  Took crestor 20 mg did have some joint aches.  Uncertain if joint aches were really related to Crestor.  Had a lot of other things going on at that time.  Is agreeable to trying again.    Last Pap: 9/21-normal. Never an abnormal  Last mammogram: scheduled 11/23-no family history  Last BMD: N/A  Last Colonoscopy: Never, no family history.  Plans to schedule  Last eye exam: yearly  Last dental exam: every 6 mo  Last tetanus vaccine: 2018  Last influenza vaccine: Plans to get here today  Last shingles vaccine: Plans to get at pharmacy   Last pneumonia vaccine: N/A  Last COVID vaccine: Has had both doses  Last COVID booster: Plans to get here today  Hep C screen: 9/21  HIV screen: 10/18  AAA screen (age 65-78 with smoking hx): N/A  IVD (HTN, Hyperlipid, Smoking): N/A  Lung CA screening (50-77, 20 pk smoking hx): Quit smoking in 1993.  Was smoking about half package per day at that time.  For 6 years.       Review of Systems   Constitutional:  Negative for activity change, chills, fatigue and fever.   HENT:  Positive for congestion. Negative for ear pain, hearing loss, rhinorrhea and sore throat.    Eyes:  Negative for pain and visual disturbance.   Respiratory:  Positive for cough. Negative for chest tightness, shortness  "of breath and wheezing.    Cardiovascular:  Negative for chest pain, palpitations and peripheral edema.   Gastrointestinal:  Negative for abdominal distention, abdominal pain, constipation, diarrhea, heartburn, hematochezia, nausea and vomiting.   Endocrine: Negative for cold intolerance, heat intolerance, polydipsia, polyphagia and polyuria.   Breasts:  Negative for tenderness, breast mass and discharge.   Genitourinary:  Negative for difficulty urinating, dysuria, enuresis, frequency, genital sores, hematuria, pelvic pain, urgency, vaginal bleeding and vaginal discharge.   Musculoskeletal:  Negative for arthralgias, joint swelling and myalgias.   Skin:  Negative for rash.   Neurological:  Negative for dizziness, weakness, light-headedness, numbness, headaches and paresthesias.   Psychiatric/Behavioral:  Negative for dysphoric mood, mood changes and sleep disturbance. The patient is not nervous/anxious.           OBJECTIVE:   /72   Pulse 80   Temp 98.1  F (36.7  C) (Tympanic)   Resp 12   Ht 1.651 m (5' 5\")   Wt 113.4 kg (250 lb)   LMP  (LMP Unknown)   SpO2 97%   BMI 41.60 kg/m    Physical Exam  Constitutional:       Appearance: Normal appearance. She is well-developed.   HENT:      Head: Normocephalic and atraumatic.      Right Ear: Tympanic membrane and external ear normal. No middle ear effusion.      Left Ear: Tympanic membrane and external ear normal.  No middle ear effusion.      Nose: No mucosal edema.   Neck:      Thyroid: No thyromegaly.      Vascular: No carotid bruit.   Cardiovascular:      Rate and Rhythm: Normal rate and regular rhythm.      Heart sounds: Normal heart sounds.   Pulmonary:      Effort: Pulmonary effort is normal.      Breath sounds: Normal breath sounds.   Abdominal:      General: Bowel sounds are normal.      Palpations: Abdomen is soft.   Skin:     General: Skin is warm and dry.   Neurological:      Mental Status: She is alert.   Psychiatric:         Behavior: Behavior " normal.         ASSESSMENT/PLAN:   Routine general medical examination at a health care facility  Screening guidelines reviewed.   - Comprehensive metabolic panel; Future  - Hemoglobin A1c; Future  - Hemoglobin A1c  - Comprehensive metabolic panel    Screen for colon cancer  - Colonoscopy Screening  Referral; Future    Hyperlipidemia LDL goal <100  Previous lipids reviewed.  We will plan to restart rosuvastatin every other day.  To send Raiing message in 3 to 4 weeks with update on how is doing.  If is tolerating well we will plan to increase to daily.  Lab only appointment in 3 months for recheck of lipids.  - rosuvastatin (CRESTOR) 10 MG tablet; Take 1 tablet (10 mg) by mouth every other day  - Lipid panel reflex to direct LDL Fasting; Future  - Lipid panel reflex to direct LDL Fasting; Future  - Lipid panel reflex to direct LDL Fasting         COUNSELING:  Reviewed preventive health counseling, as reflected in patient instructions        She reports that she has quit smoking. Her smoking use included cigarettes. She has been exposed to tobacco smoke. She has never used smokeless tobacco.        TOY Zapata [facility]

## 2023-11-21 ENCOUNTER — ALLIED HEALTH/NURSE VISIT (OUTPATIENT)
Dept: FAMILY MEDICINE | Facility: CLINIC | Age: 58
End: 2023-11-21
Payer: COMMERCIAL

## 2023-11-21 DIAGNOSIS — Z23 ENCOUNTER FOR IMMUNIZATION: Primary | ICD-10-CM

## 2023-11-21 PROCEDURE — 99207 PR NO CHARGE NURSE ONLY: CPT

## 2023-11-21 PROCEDURE — 90471 IMMUNIZATION ADMIN: CPT

## 2023-11-21 PROCEDURE — 90636 HEP A/HEP B VACC ADULT IM: CPT

## 2023-11-21 NOTE — PROGRESS NOTES
Prior to immunization administration, verified patients identity using patient s name and date of birth. Please see Immunization Activity for additional information.     Screening Questionnaire for Adult Immunization    Are you sick today?   No   Do you have allergies to medications, food, a vaccine component or latex?   No   Have you ever had a serious reaction after receiving a vaccination?   No   Do you have a long-term health problem with heart, lung, kidney, or metabolic disease (e.g., diabetes), asthma, a blood disorder, no spleen, complement component deficiency, a cochlear implant, or a spinal fluid leak?  Are you on long-term aspirin therapy?   No   Do you have cancer, leukemia, HIV/AIDS, or any other immune system problem?   No   Do you have a parent, brother, or sister with an immune system problem?   No   In the past 3 months, have you taken medications that affect  your immune system, such as prednisone, other steroids, or anticancer drugs; drugs for the treatment of rheumatoid arthritis, Crohn s disease, or psoriasis; or have you had radiation treatments?   No   Have you had a seizure, or a brain or other nervous system problem?   No   During the past year, have you received a transfusion of blood or blood    products, or been given immune (gamma) globulin or antiviral drug?   No   For women: Are you pregnant or is there a chance you could become       pregnant during the next month?   No   Have you received any vaccinations in the past 4 weeks?   No     Immunization questionnaire answers were all negative.    I have reviewed the following standing orders:     This patient is due and qualifies for the Hepatitis A & B vaccine.    Click here for HEP A & B STANDING ORDER    I have reviewed the vaccines inclusion and exclusion criteria; No concerns regarding eligibility.     Patient instructed to remain in clinic for 15 minutes afterwards, and to report any adverse reactions.     Screening performed by  Sofía Pickering MA on 11/21/2023 at 10:35 AM.

## 2023-11-22 ENCOUNTER — HOSPITAL ENCOUNTER (OUTPATIENT)
Dept: MAMMOGRAPHY | Facility: CLINIC | Age: 58
Discharge: HOME | End: 2023-11-22
Attending: NURSE PRACTITIONER | Admitting: NURSE PRACTITIONER
Payer: COMMERCIAL

## 2023-11-22 DIAGNOSIS — Z12.31 SCREENING MAMMOGRAM, ENCOUNTER FOR: ICD-10-CM

## 2023-11-22 PROCEDURE — 77067 SCR MAMMO BI INCL CAD: CPT

## 2023-11-24 ENCOUNTER — MYC MEDICAL ADVICE (OUTPATIENT)
Dept: FAMILY MEDICINE | Facility: CLINIC | Age: 58
End: 2023-11-24
Payer: COMMERCIAL

## 2023-11-24 DIAGNOSIS — R92.8 ABNORMAL MAMMOGRAM: Primary | ICD-10-CM

## 2023-11-27 NOTE — TELEPHONE ENCOUNTER
"Routing MyChart to PCP.  Patient is asking you to sign orders (pended) for an ultrasound of left breast due to abnormal mammogram.     \"  Narrative:     SCREENING MAMMOGRAM, BILATERAL, DIGITAL w/CAD  11/22/2023 10:34 AM    BREAST SYMPTOMS: No current breast complaints.    COMPARISON:  10/20/2022, 9/28/2021, 8/27/2020.    BREAST DENSITY: Scattered fibroglandular densities.    COMMENTS: There is a small round increased density mass in the  retroareolar region of the left breast. Right breast is stable in  appearance.   Impression:     IMPRESSION: BI-RADS CATEGORY: 0 - Incomplete - Need Additional Imaging  Evaluation and/or Prior Mammograms for Comparison    RECOMMENDED FOLLOW-UP: Ultrasound on the left.    Exam results letter mailed to patient.      JERMAINE RODRIGUEZ MD        SYSTEM ID:  [id number]\"\"      Marcell Campbell RN  Triage Nurse  [facility], [facility]       "

## 2023-11-28 ENCOUNTER — HOSPITAL ENCOUNTER (OUTPATIENT)
Dept: ULTRASOUND IMAGING | Facility: CLINIC | Age: 58
Discharge: HOME | End: 2023-11-28
Attending: NURSE PRACTITIONER | Admitting: NURSE PRACTITIONER
Payer: COMMERCIAL

## 2023-11-28 DIAGNOSIS — R92.8 ABNORMAL MAMMOGRAM: ICD-10-CM

## 2023-11-28 PROCEDURE — 76642 ULTRASOUND BREAST LIMITED: CPT | Mod: LT

## 2023-12-26 ENCOUNTER — ALLIED HEALTH/NURSE VISIT (OUTPATIENT)
Dept: FAMILY MEDICINE | Facility: CLINIC | Age: 58
End: 2023-12-26
Payer: COMMERCIAL

## 2023-12-26 DIAGNOSIS — Z23 NEED FOR HEPATITIS VACCINATION: Primary | ICD-10-CM

## 2023-12-26 PROCEDURE — 90636 HEP A/HEP B VACC ADULT IM: CPT

## 2023-12-26 PROCEDURE — 90471 IMMUNIZATION ADMIN: CPT

## 2023-12-26 PROCEDURE — 99207 PR NO CHARGE NURSE ONLY: CPT

## 2023-12-26 NOTE — PROGRESS NOTES
Prior to immunization administration, verified patients identity using patient s name and date of birth. Please see Immunization Activity for additional information.     Screening Questionnaire for Adult Immunization    Are you sick today?   No   Do you have allergies to medications, food, a vaccine component or latex?   No   Have you ever had a serious reaction after receiving a vaccination?   No   Do you have a long-term health problem with heart, lung, kidney, or metabolic disease (e.g., diabetes), asthma, a blood disorder, no spleen, complement component deficiency, a cochlear implant, or a spinal fluid leak?  Are you on long-term aspirin therapy?   No   Do you have cancer, leukemia, HIV/AIDS, or any other immune system problem?   No   Do you have a parent, brother, or sister with an immune system problem?   No   In the past 3 months, have you taken medications that affect  your immune system, such as prednisone, other steroids, or anticancer drugs; drugs for the treatment of rheumatoid arthritis, Crohn s disease, or psoriasis; or have you had radiation treatments?   No   Have you had a seizure, or a brain or other nervous system problem?   No   During the past year, have you received a transfusion of blood or blood    products, or been given immune (gamma) globulin or antiviral drug?   No   For women: Are you pregnant or is there a chance you could become       pregnant during the next month?   No   Have you received any vaccinations in the past 4 weeks?   No     Immunization questionnaire answers were all negative.    I have reviewed the following standing orders:     This patient is due and qualifies for the Hepatitis A & B vaccine.    Click here for HEP A & B STANDING ORDER    I have reviewed the vaccines inclusion and exclusion criteria; No concerns regarding eligibility.     Patient instructed to remain in clinic for 15 minutes afterwards, and to report any adverse reactions.     Screening performed by  Colleen Roque on 12/26/2023 at 9:29 AM.

## 2024-04-25 ENCOUNTER — OFFICE VISIT (OUTPATIENT)
Dept: FAMILY MEDICINE | Facility: CLINIC | Age: 59
End: 2024-04-25
Payer: COMMERCIAL

## 2024-04-25 ENCOUNTER — ANCILLARY PROCEDURE (OUTPATIENT)
Dept: GENERAL RADIOLOGY | Facility: CLINIC | Age: 59
End: 2024-04-25
Attending: PHYSICIAN ASSISTANT
Payer: COMMERCIAL

## 2024-04-25 VITALS
OXYGEN SATURATION: 98 % | TEMPERATURE: 97.4 F | WEIGHT: 252.8 LBS | BODY MASS INDEX: 42.12 KG/M2 | RESPIRATION RATE: 14 BRPM | SYSTOLIC BLOOD PRESSURE: 118 MMHG | HEIGHT: 65 IN | DIASTOLIC BLOOD PRESSURE: 82 MMHG | HEART RATE: 90 BPM

## 2024-04-25 DIAGNOSIS — R07.9 CHEST PAIN, UNSPECIFIED TYPE: Primary | ICD-10-CM

## 2024-04-25 DIAGNOSIS — R07.9 CHEST PAIN, UNSPECIFIED TYPE: ICD-10-CM

## 2024-04-25 PROCEDURE — 71046 X-RAY EXAM CHEST 2 VIEWS: CPT | Mod: TC | Performed by: RADIOLOGY

## 2024-04-25 PROCEDURE — 99213 OFFICE O/P EST LOW 20 MIN: CPT | Mod: 25 | Performed by: PHYSICIAN ASSISTANT

## 2024-04-25 PROCEDURE — 93000 ELECTROCARDIOGRAM COMPLETE: CPT | Performed by: PHYSICIAN ASSISTANT

## 2024-04-25 ASSESSMENT — PAIN SCALES - GENERAL: PAINLEVEL: MODERATE PAIN (4)

## 2024-04-25 NOTE — PROGRESS NOTES
"  Assessment & Plan   Problem List Items Addressed This Visit    None  Visit Diagnoses       Chest pain, unspecified type    -  Primary    Relevant Orders    XR Chest 2 Views (Completed)    Echocardiogram Exercise Stress    EKG 12-lead complete w/read - Clinics (Completed)           Impression is chest discomfort from unknown etiology, but by history, most likely GERD vs gastritis given improvement H2 blocker. EKG nonischemic and without worrisome dysrhythmia. Low suspicion for ACS. Not tachycardic, hypoxic or high risk for PE. CXR shows no pneumonia, pneumothorax, pleural effusion, edema, or other worrisome process. Given asymptomatic currently, will trial omeprazole for 2 weeks and obtain stress echo as a precaution. Follow up with primary or myself prn.    Complete history and physical exam as below. Afebrile with normal vital signs.    DDx and Dx discussed with and explained to the pt to their satisfaction.  All questions were answered at this time. Pt expressed understanding of and agreement with this dx, tx, and plan. No further workup warranted and standard medication warnings given. I have given the patient a list of pertinent indications for re-evaluation. Will go to the Emergency Department if symptoms worsen or new concerning symptoms arise. Patient left in no apparent distress.       Ordering of each unique test     BMI  Estimated body mass index is 42.12 kg/m  as calculated from the following:    Height as of this encounter: 1.65 m (5' 4.96\").    Weight as of this encounter: 114.7 kg (252 lb 12.8 oz).     See Patient Instructions      Yuly Newton is a 58 year old, presenting for the following health issues:  Gastrophageal Reflux        4/25/2024     7:01 AM   Additional Questions   Roomed by Carlos Mullen CMA   Accompanied by N/A         4/25/2024     7:01 AM   Patient Reported Additional Medications   Patient reports taking the following new medications No new medications     History of " "Present Illness       Reason for visit:  Gerd/acid reflux  Symptom onset:  1-2 weeks ago  Symptoms include:  Chest ache, back ache  Symptom intensity:  Moderate  Symptom progression:  Improving  Had these symptoms before:  Yes  Has tried/received treatment for these symptoms:  Yes  Previous treatment was successful:  Yes  Prior treatment description:  Can t remember  What makes it worse:  Not sure  What makes it better:  Famitodine but I think its giving me headaches so i quit taking it    She eats 2-3 servings of fruits and vegetables daily.She consumes 0 sweetened beverage(s) daily.She exercises with enough effort to increase her heart rate 20 to 29 minutes per day.  She exercises with enough effort to increase her heart rate 3 or less days per week.   She is taking medications regularly.     Bruised feeling in the chest radiating into the back. Took 20mg famotidine qid. Uncomfortable to touch. Patient is coming in today possible GERD, last week she had chest pain but since yesterday was feeling better after eating more bland foods and changing diet.  Pain has moved more to upper back and neck.  She is also looking for a change in medication (from famotidine to something else). Was worse when supine. Felt improved with walking/exertion. 75% better this week.  Asymptomatic now.     Review of Systems  Constitutional, HEENT, cardiovascular, pulmonary, gi and gu systems are negative, except as otherwise noted.      Objective    /82   Pulse 90   Temp 97.4  F (36.3  C) (Temporal)   Resp 14   Ht 1.65 m (5' 4.96\")   Wt 114.7 kg (252 lb 12.8 oz)   LMP  (LMP Unknown)   SpO2 98%   BMI 42.12 kg/m    Body mass index is 42.12 kg/m .  Physical Exam  Vitals and nursing note reviewed.   Constitutional:       General: She is not in acute distress.     Appearance: She is not ill-appearing or diaphoretic.   HENT:      Head: Normocephalic and atraumatic.      Mouth/Throat:      Mouth: Mucous membranes are moist.   Eyes: "      Conjunctiva/sclera: Conjunctivae normal.   Cardiovascular:      Rate and Rhythm: Normal rate and regular rhythm.      Heart sounds: Normal heart sounds. No murmur heard.     No friction rub. No gallop.      Comments: 2+ symmetric radial/PT pulses. No LE edema or tenderness.  Pulmonary:      Effort: Pulmonary effort is normal. No respiratory distress.      Breath sounds: Normal breath sounds. No stridor. No wheezing, rhonchi or rales.   Abdominal:      General: Bowel sounds are normal. There is no distension.      Palpations: Abdomen is soft. There is no mass.      Tenderness: There is no abdominal tenderness. There is no guarding or rebound.      Hernia: No hernia is present.   Skin:     General: Skin is warm and dry.   Neurological:      General: No focal deficit present.      Mental Status: She is alert. Mental status is at baseline.   Psychiatric:         Mood and Affect: Mood normal.         Behavior: Behavior normal.          EKG - Reviewed and interpreted by me appears normal, NSR, normal axis, normal intervals, no acute ST/T changes c/w ischemia, no LVH by voltage criteria, unchanged from previous tracings aside from slightly inverted T now in lead III  Results for orders placed or performed in visit on 04/25/24   XR Chest 2 Views     Status: None    Narrative    CHEST TWO VIEWS  4/25/2024 7:59 AM     HISTORY:  Chest pain, unspecified type.    COMPARISON: 5/6/2009.      Impression    IMPRESSION: Negative chest. Lungs clear.    BRISEYDA RONQUILLO MD         SYSTEM ID:  [id number]           Signed Electronically by: YASHIRA Medeiros

## 2024-04-25 NOTE — PATIENT INSTRUCTIONS
Kedar Newton,    Thank you for allowing [facility] to manage your care.    I am unsure of the cause of your symptoms, but your exam is reassuring. This could very well be acid reflux/GERD. We will see what our workup shows.     If you develop worsening/changing symptoms at any time such as shortness of breath, discomfort worse with exertion, worsening discomfort, or other worrisome symptoms, please be seen in urgent care or call 911/go to the emergency department for evaluation as we discussed.    I ordered some xrays. Please go to our radiology department to get your xrays.    I made a referral to get a stress test of your heart. They will be calling in approximately 1 week to set up your appointment.  If you do not hear from them, please call the specialty number on your after visit summary.     Please allow 1-2 business days for our office to contact you in regards to your laboratory/radiological studies.  If not done so, I encourage you to login into [employer] ([email or web address] [email or web address] to review your results as well.     Try 20mg of omeprazole for 2 weeks and if your discomfort persists, please be re-evaluated.    If you have any questions or concerns, please feel free to call us at [phone]    Sincerely,    Jose G Wallis PA-C    Did you know?      You can schedule a video visit for follow-up appointments as well as future appointments for certain conditions.  Please see the below link.     [email or web address]    If you have not already done so,  I encourage you to sign up for [employer] ([email or web address] [email or web address][email or web address]).  This will allow you to review your results, securely communicate with a provider, and schedule virtual visits as well.

## 2024-05-22 ENCOUNTER — OFFICE VISIT (OUTPATIENT)
Dept: FAMILY MEDICINE | Facility: CLINIC | Age: 59
End: 2024-05-22
Payer: COMMERCIAL

## 2024-05-22 VITALS
OXYGEN SATURATION: 97 % | RESPIRATION RATE: 20 BRPM | TEMPERATURE: 97.2 F | DIASTOLIC BLOOD PRESSURE: 92 MMHG | HEART RATE: 110 BPM | SYSTOLIC BLOOD PRESSURE: 144 MMHG

## 2024-05-22 DIAGNOSIS — R03.0 ELEVATED BLOOD PRESSURE READING WITHOUT DIAGNOSIS OF HYPERTENSION: ICD-10-CM

## 2024-05-22 DIAGNOSIS — B37.2 CANDIDAL INTERTRIGO: Primary | ICD-10-CM

## 2024-05-22 PROCEDURE — 99213 OFFICE O/P EST LOW 20 MIN: CPT | Performed by: FAMILY MEDICINE

## 2024-05-22 RX ORDER — CLOTRIMAZOLE 1 %
CREAM (GRAM) TOPICAL 2 TIMES DAILY
Qty: 45 G | Refills: 0 | Status: SHIPPED | OUTPATIENT
Start: 2024-05-22

## 2024-05-22 RX ORDER — NYSTATIN 100000 [USP'U]/G
POWDER TOPICAL PRN
Qty: 60 G | Refills: 0 | Status: SHIPPED | OUTPATIENT
Start: 2024-05-22

## 2024-05-22 NOTE — PROGRESS NOTES
"  Assessment & Plan     Lamar was seen today for derm problem.    Diagnoses and all orders for this visit:    Candidal intertrigo  -     clotrimazole (LOTRIMIN) 1 % external cream; Apply topically 2 times daily X 2 weeks (to affected area)  -     nystatin (MYCOSTATIN) 415762 UNIT/GM external powder; Apply topically as needed for dry skin - keep skin dry  -     Encouraged to keep the area clean and dry. Weight management- encouraged to maintain a healthy weight/BMI    Elevated blood pressure reading without diagnosis of hypertension- most likely work related stress.        -     Keep a BP log at home.        -     BP recheck in a month      Patient education and Handout with home care instructions given. See AVS for details.      BMI  Estimated body mass index is 42.12 kg/m  as calculated from the following:    Height as of 4/25/24: 1.65 m (5' 4.96\").    Weight as of 4/25/24: 114.7 kg (252 lb 12.8 oz).   Weight management plan: Discussed healthy diet and exercise guidelines      Return in about 2 weeks (around 6/5/2024), or if symptoms worsen or fail to improve.      Subjective   Lamar is a 58 year old, presenting for the following health issues:  Derm Problem      5/22/2024     8:07 AM   Additional Questions   Roomed by MP         5/22/2024     8:07 AM   Patient Reported Additional Medications   Patient reports taking the following new medications None per patient     History of Present Illness       Reason for visit:  Rash on stomach  Symptom onset:  1-3 days ago  Symptom intensity:  Mild  Symptom progression:  Staying the same  Had these symptoms before:  Yes    She eats 2-3 servings of fruits and vegetables daily.She consumes 0 sweetened beverage(s) daily.She exercises with enough effort to increase her heart rate 10 to 19 minutes per day.  She exercises with enough effort to increase her heart rate 3 or less days per week.   She is taking medications regularly.           Rash  Onset: 1 week  Description: "   Location: under abdominal fold on the left side  Character: linear, burning, red  Itching (Pruritis): no   Progression of Symptoms:  worsening  Accompanying Signs & Symptoms:  Fever: no   Body aches or joint pain: no   Sore throat symptoms: no   Recent cold symptoms: no   History:   Previous similar rash: YES- axillary folds  Precipitating factors:   Exposure to similar rash: no   New exposures: None   Recent travel: no   Alleviating factors:  unknown    Therapies Tried and outcome: OTC antifungal cream.     Blood pressure is elevated in the clinic today-   BP Readings from Last 3 Encounters:   05/22/24 (!) 144/92   04/25/24 118/82   10/23/23 136/72     Has no known history of hypertension- has been under stress at her job. 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         Review of Systems  Constitutional, HEENT, cardiovascular, pulmonary, gi and gu systems are negative, except as otherwise noted.      Objective    BP (!) 144/96   Pulse 110   Temp 97.2  F (36.2  C) (Temporal)   Resp 20   LMP  (LMP Unknown)   SpO2 97%   There is no height or weight on file to calculate BMI.  Physical Exam   GENERAL: alert and no distress  SKIN: intense erythematous rash below the abdominal fold on the left side with fissures but no signs of a secondary bacterial infection.             Signed Electronically by: Karmen Cooney MD

## 2024-05-30 ENCOUNTER — ALLIED HEALTH/NURSE VISIT (OUTPATIENT)
Dept: FAMILY MEDICINE | Facility: CLINIC | Age: 59
End: 2024-05-30
Payer: COMMERCIAL

## 2024-05-30 DIAGNOSIS — Z23 NEED FOR HEPATITIS A AND B VACCINATION: Primary | ICD-10-CM

## 2024-05-30 PROCEDURE — 90471 IMMUNIZATION ADMIN: CPT

## 2024-05-30 PROCEDURE — 99207 PR NO CHARGE NURSE ONLY: CPT

## 2024-05-30 PROCEDURE — 90636 HEP A/HEP B VACC ADULT IM: CPT

## 2024-05-30 NOTE — PROGRESS NOTES
Prior to immunization administration, verified patients identity using patient s name and date of birth. Please see Immunization Activity for additional information.     Screening Questionnaire for Adult Immunization    Are you sick today?   No   Do you have allergies to medications, food, a vaccine component or latex?   No   Have you ever had a serious reaction after receiving a vaccination?   No   Do you have a long-term health problem with heart, lung, kidney, or metabolic disease (e.g., diabetes), asthma, a blood disorder, no spleen, complement component deficiency, a cochlear implant, or a spinal fluid leak?  Are you on long-term aspirin therapy?   No   Do you have cancer, leukemia, HIV/AIDS, or any other immune system problem?   No   Do you have a parent, brother, or sister with an immune system problem?   No   In the past 3 months, have you taken medications that affect  your immune system, such as prednisone, other steroids, or anticancer drugs; drugs for the treatment of rheumatoid arthritis, Crohn s disease, or psoriasis; or have you had radiation treatments?   No   Have you had a seizure, or a brain or other nervous system problem?   No   During the past year, have you received a transfusion of blood or blood    products, or been given immune (gamma) globulin or antiviral drug?   No   For women: Are you pregnant or is there a chance you could become       pregnant during the next month?   No   Have you received any vaccinations in the past 4 weeks?   No     Immunization questionnaire answers were all negative.    I have reviewed the following standing orders:     This patient is due and qualifies for the Hepatitis A & B vaccine.    Click here for HEP A & B STANDING ORDER    I have reviewed the vaccines inclusion and exclusion criteria; No concerns regarding eligibility.     Patient instructed to remain in clinic for 15 minutes afterwards, and to report any adverse reactions.     Screening performed by  Ilene Redmond CMA on 5/30/2024 at 9:16 AM.

## 2024-09-30 ENCOUNTER — OFFICE VISIT (OUTPATIENT)
Dept: FAMILY MEDICINE | Facility: CLINIC | Age: 59
End: 2024-09-30
Payer: COMMERCIAL

## 2024-09-30 VITALS
DIASTOLIC BLOOD PRESSURE: 86 MMHG | RESPIRATION RATE: 20 BRPM | SYSTOLIC BLOOD PRESSURE: 124 MMHG | HEART RATE: 90 BPM | TEMPERATURE: 98.5 F | BODY MASS INDEX: 42.12 KG/M2 | OXYGEN SATURATION: 97 % | HEIGHT: 65 IN

## 2024-09-30 DIAGNOSIS — Z12.11 SCREEN FOR COLON CANCER: Primary | ICD-10-CM

## 2024-09-30 DIAGNOSIS — M25.512 ACUTE PAIN OF LEFT SHOULDER: ICD-10-CM

## 2024-09-30 DIAGNOSIS — E78.5 HYPERLIPIDEMIA LDL GOAL <100: ICD-10-CM

## 2024-09-30 PROCEDURE — 99213 OFFICE O/P EST LOW 20 MIN: CPT | Performed by: NURSE PRACTITIONER

## 2024-09-30 RX ORDER — METHYLPREDNISOLONE 4 MG
TABLET, DOSE PACK ORAL
Qty: 21 TABLET | Refills: 0 | Status: SHIPPED | OUTPATIENT
Start: 2024-09-30

## 2024-09-30 NOTE — PATIENT INSTRUCTIONS
Prescription to the pharmacy for Medrol.  Please take it with food and earlier in the day.    Please let me know if the pain does not improve over the next 1 to 2 weeks and may need to get you set up for physical therapy.

## 2024-09-30 NOTE — PROGRESS NOTES
"  Assessment & Plan     Acute pain of left shoulder  Prescription given for Medrol.  Educated on use and possible side effects.  Notify if no improvement over the next 1 to 2 weeks and may need to arrange for physical therapy.  - methylPREDNISolone (MEDROL DOSEPAK) 4 MG tablet therapy pack; Follow Package Directions        Yuly Newton is a 59 year old, presenting for the following health issues:  Pain        9/30/2024     1:04 PM   Additional Questions   Roomed by Lorie GARZA         9/30/2024     1:04 PM   Patient Reported Additional Medications   Patient reports taking the following new medications None per patient     History of Present Illness       Reason for visit:  Ache in my side/rib area  Symptom onset:  More than a month  Symptom intensity:  Mild  Symptom progression:  Staying the same  Had these symptoms before:  No  What makes it worse:  Sleeping on that side   She is taking medications regularly.       Since June has been having pain to left side. Pain will be on side and then at times will go into left breast and then at times will go under left arm. Area feels bruised. No injury that is aware. If lays on left side will be very sore. Left ribs sore. No lumps, mammog is up to date. Had EKG in April that was ok. Does get aching into left shoulder and elbow does get numb and tingling at times. Does not have days that is completely gone but has certain days that pain is worse than others.         Objective    /86   Pulse 90   Temp 98.5  F (36.9  C) (Temporal)   Resp 20   Ht 1.65 m (5' 4.96\")   LMP  (LMP Unknown)   SpO2 97%   BMI 42.12 kg/m    Body mass index is 42.12 kg/m .  Physical Exam  Constitutional:       Appearance: Normal appearance. She is well-developed.   HENT:      Nose: No congestion.   Eyes:      General: Lids are normal.   Cardiovascular:      Rate and Rhythm: Normal rate and regular rhythm.   Pulmonary:      Effort: Pulmonary effort is normal. No tachypnea, bradypnea or " respiratory distress.      Breath sounds: Normal breath sounds.   Musculoskeletal:      Left shoulder: Tenderness and bony tenderness present. No swelling. Decreased range of motion. Decreased strength.   Skin:     General: Skin is warm.      Coloration: Skin is not ashen, cyanotic, jaundiced or pale.   Neurological:      Mental Status: She is alert.   Psychiatric:         Mood and Affect: Mood normal.         Speech: Speech normal.         Behavior: Behavior normal.              Signed Electronically by: TOY Zapata CNP

## 2024-11-26 ENCOUNTER — HOSPITAL ENCOUNTER (OUTPATIENT)
Dept: MAMMOGRAPHY | Facility: CLINIC | Age: 59
Discharge: HOME | End: 2024-11-26
Attending: NURSE PRACTITIONER
Payer: COMMERCIAL

## 2024-11-26 DIAGNOSIS — Z12.31 VISIT FOR SCREENING MAMMOGRAM: ICD-10-CM

## 2024-11-26 PROCEDURE — 77063 BREAST TOMOSYNTHESIS BI: CPT

## 2024-11-26 PROCEDURE — 77067 SCR MAMMO BI INCL CAD: CPT

## 2025-01-04 ENCOUNTER — HEALTH MAINTENANCE LETTER (OUTPATIENT)
Age: 60
End: 2025-01-04

## 2025-06-25 ENCOUNTER — OFFICE VISIT (OUTPATIENT)
Dept: ORTHOPEDICS | Facility: CLINIC | Age: 60
End: 2025-06-25
Payer: COMMERCIAL

## 2025-06-25 ENCOUNTER — ANCILLARY PROCEDURE (OUTPATIENT)
Dept: GENERAL RADIOLOGY | Facility: CLINIC | Age: 60
End: 2025-06-25
Attending: FAMILY MEDICINE
Payer: COMMERCIAL

## 2025-06-25 VITALS — BODY MASS INDEX: 42.99 KG/M2 | HEIGHT: 65 IN | WEIGHT: 258 LBS

## 2025-06-25 DIAGNOSIS — G57.01 PIRIFORMIS SYNDROME OF RIGHT SIDE: ICD-10-CM

## 2025-06-25 DIAGNOSIS — M79.18 RIGHT BUTTOCK PAIN: ICD-10-CM

## 2025-06-25 DIAGNOSIS — M76.01 GLUTEAL TENDINITIS OF RIGHT BUTTOCK: ICD-10-CM

## 2025-06-25 DIAGNOSIS — M95.5 PELVIC OBLIQUITY: ICD-10-CM

## 2025-06-25 DIAGNOSIS — M70.61 TROCHANTERIC BURSITIS OF RIGHT HIP: ICD-10-CM

## 2025-06-25 DIAGNOSIS — M79.18 MYALGIA, OTHER SITE: ICD-10-CM

## 2025-06-25 DIAGNOSIS — M79.18 RIGHT BUTTOCK PAIN: Primary | ICD-10-CM

## 2025-06-25 PROCEDURE — 73502 X-RAY EXAM HIP UNI 2-3 VIEWS: CPT | Mod: TC | Performed by: STUDENT IN AN ORGANIZED HEALTH CARE EDUCATION/TRAINING PROGRAM

## 2025-06-25 PROCEDURE — 99204 OFFICE O/P NEW MOD 45 MIN: CPT | Performed by: FAMILY MEDICINE

## 2025-06-25 PROCEDURE — G2211 COMPLEX E/M VISIT ADD ON: HCPCS | Performed by: FAMILY MEDICINE

## 2025-06-25 NOTE — LETTER
"2025      Lamar Zimmerman  [address] [place] [id number]      Dear Colleague,    Thank you for referring your patient, Lamar Zimmerman, to the [facility]. Please see a copy of my visit note below.    Lamar Zimmerman  :  1965  DOS: 2025  MRN: [id number]    Sports Medicine Clinic Visit    PCP: Miya Dunbar    Lamar Zimmerman is a 59 year old female who is seen as a self referral presenting with acute on chronic radiating right buttocks pain.    Injury: Gradual onset of waxing & waning chronic pain over the past 8+ years, worse over the past 3 - 4 months.  Pain located over right deep posterior & lateral hip with radiation to lateral thigh & lower leg.  Reports constant radiating aching pain, \"heaviness\" to lateral thigh & lower leg.  Additional Features:  Positive: numbness and weakness.  Symptoms are better with sitting, pressure point therapy, stretching.  Symptoms are worse with: standing/walking > 5 minutes, lying on right side, lifting/bending.  Other evaluation and/or treatments so far consists of: Ice, Heat, Ibuprofen, Rest, self pressure point, HEP.  Recent imaging completed: No recent imaging completed.  Prior History of related problems: history of chronic buttocks pain - previously completed physical therapy in 2017.    Social History: currently employed as     Review of Systems  Musculoskeletal: as above  Remainder of review of systems is negative including constitutional, CV, pulmonary, GI, Skin and Neurologic except as noted in HPI or medical history.    Past Medical History:   Diagnosis Date     Abnormal maternal glucose tolerance, complicating pregnancy, childbirth, or the puerperium, unspecified as to episode of care     Gestational Diabetes     Arthritis Knee, 6 months ago?     ATYPICAL CHEST PAIN 2007 - EKG and CXR negative.  Better with exercise.  Worse with sitting. Good posture, " "APAP, cold packs.     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  Onset after leaning on desk with elbow.  No neck injury or pain.  Sensory deficit without motor deficit in radial distribution     Right trochanteric bursitis 2005: Pain at right hip - localized pain worse with lateral motion - pushing right hip out.  Recent methylprednisolone     Past Surgical History:   Procedure Laterality Date     ABDOMEN SURGERY  ??    Ovarian cyst     SURGICAL HISTORY OF -       Laparoscopy-Ovarian Cyst     ZC APPENDECTOMY  3/1991     Z  DELIVERY ONLY      , Low Cervical     Family History   Problem Relation Age of Onset     Hypertension Mother      Diabetes Maternal Grandmother      Cerebrovascular Disease Paternal Grandmother      C.A.D. Paternal Grandfather      Diabetes Paternal Grandfather      Breast Cancer Other         cousin     Cerebrovascular Disease Father      Breast Cancer Father         Esophageal     Diabetes Brother      Hypertension Brother      Cerebrovascular Disease Sister        Objective  Ht 1.651 m (5' 5\")   Wt 117 kg (258 lb)   LMP  (LMP Unknown)   BMI 42.93 kg/m      General: healthy, alert and in no distress    HEENT: no scleral icterus or conjunctival erythema   Skin: no suspicious lesions or rash. No jaundice.   CV: regular rhythm by palpation, 2+ distal pulses, no pedal edema    Resp: normal respiratory effort without conversational dyspnea   Psych: normal mood and affect    Gait: mildly antalgic, appropriate coordination and balance   Neuro: normal light touch sensory exam of the extremities. Motor strength as noted below     Right hip exam    Inspection:        no edema or ecchymosis in hip area       Right innominate anterior rotation and outflare, L on L sacral torsion    ROM:       Full active and passive ROM       Mild pain terminal " adduction, IR, ER over lateral hip    Strength:        flexion 5/5       extension 5/5       abduction 5/5, painful       adduction 5/5       ER 5/5, mild pain    Tender:        greater trochanter       Gluteus medius > max       Piriformis milder       TFL mild    Non Tender:        remainder of hip area       illiac crest       ASIS       SI joint       Lower lumbar spine    Sensation:        grossly intact in hip and thigh    Skin:       well perfused       capillary refill brisk    Special Tests:        neg (-) DAT       neg (-) FADIR       neg (-) scour       Mildly painful Cortney       Neg SLR and slump but R>L hamstring tightness    Radiology  Recent Results (from the past 744 hours)   XR Pelvis w Hip RT 1 View    Narrative    EXAM: XR PELVIS AND HIP RIGHT 1 VIEW  LOCATION: [facility]  DATE: 6/25/2025    INDICATION:  Right buttock pain  COMPARISON: None.      Impression    IMPRESSION: Anatomic alignment of the right hip. No fracture. Bilateral hip joint spaces are preserved. 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       Assessment:  1. Right buttock pain    2. Gluteal tendinitis of right buttock    3. Myalgia, other site    4. Trochanteric bursitis of right hip    5. Pelvic obliquity    6. Piriformis syndrome of right side        Plan:  Discussed the assessment with the patient.  Follow up: 1-2 mo if not improving, sooner if worsening  Continue to follow up with me for further treatment and recommendations  Acute on chronic right hip pain with intermittent radiating pain  Pelvic obliquity and modest core stability contributing, reviewed in detail  Does have GTB pain and associated gluteal tendinitis, milder piriformis sx, no hip joint irritability  No radicular features to exam today, reassuring  Mild SI joint DJD noted on XR, hip spaces preserved, no acute findings  XR images independently visualized and  reviewed with patient today in clinic  Oral Tylenol and topical Voltaren gel reviewed as safe OTC options, reviewed safe dosing strategies  We discussed modified progressive pain-free activity as tolerated  PT ordered, reviewed goals and low impact activity strategies  Could consider trial of US guided CSI in the future, defer for now  TPI could be considered as well, dry needling with PT could be equally helpful  Home handouts provided and supportive care reviewed  All questions were answered today  Contact us with additional questions or concerns  Signs and sx of concern reviewed      Patrice Jamil DO, HINA  Sports Medicine Physician  [facility]          Disclaimer: This note consists of symbols derived from keyboarding, dictation and/or voice recognition software. As a result, there may be errors in the script that have gone undetected. Please consider this when interpreting information found in this chart.    Again, thank you for allowing me to participate in the care of your patient.        Sincerely,        Patrice Jamil DO    Electronically signed

## 2025-06-25 NOTE — PROGRESS NOTES
"Lamar Zimmerman  :  1965  DOS: 2025  MRN: [id number]    Sports Medicine Clinic Visit    PCP: Miya Dunbar    Lamar Zimmerman is a 59 year old female who is seen as a self referral presenting with acute on chronic radiating right buttocks pain.    Injury: Gradual onset of waxing & waning chronic pain over the past 8+ years, worse over the past 3 - 4 months.  Pain located over right deep posterior & lateral hip with radiation to lateral thigh & lower leg.  Reports constant radiating aching pain, \"heaviness\" to lateral thigh & lower leg.  Additional Features:  Positive: numbness and weakness.  Symptoms are better with sitting, pressure point therapy, stretching.  Symptoms are worse with: standing/walking > 5 minutes, lying on right side, lifting/bending.  Other evaluation and/or treatments so far consists of: Ice, Heat, Ibuprofen, Rest, self pressure point, HEP.  Recent imaging completed: No recent imaging completed.  Prior History of related problems: history of chronic buttocks pain - previously completed physical therapy in 2017.    Social History: currently employed as     Review of Systems  Musculoskeletal: as above  Remainder of review of systems is negative including constitutional, CV, pulmonary, GI, Skin and Neurologic except as noted in HPI or medical history.    Past Medical History:   Diagnosis Date    Abnormal maternal glucose tolerance, complicating pregnancy, childbirth, or the puerperium, unspecified as to episode of care     Gestational Diabetes    Arthritis Knee, 6 months ago?    ATYPICAL CHEST PAIN 2007 - EKG and CXR negative.  Better with exercise.  Worse with sitting. Good posture, APAP, cold packs.    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  Onset after leaning on desk with elbow.  No neck injury or pain.  Sensory deficit " "without motor deficit in radial distribution    Right trochanteric bursitis 2005: Pain at right hip - localized pain worse with lateral motion - pushing right hip out.  Recent methylprednisolone     Past Surgical History:   Procedure Laterality Date    ABDOMEN SURGERY  ??    Ovarian cyst    SURGICAL HISTORY OF -       Laparoscopy-Ovarian Cyst    ZC APPENDECTOMY  3/1991    Z  DELIVERY ONLY      , Low Cervical     Family History   Problem Relation Age of Onset    Hypertension Mother     Diabetes Maternal Grandmother     Cerebrovascular Disease Paternal Grandmother     C.A.D. Paternal Grandfather     Diabetes Paternal Grandfather     Breast Cancer Other         cousin    Cerebrovascular Disease Father     Breast Cancer Father         Esophageal    Diabetes Brother     Hypertension Brother     Cerebrovascular Disease Sister        Objective  LMP  (LMP Unknown)     General: healthy, alert and in no distress    HEENT: no scleral icterus or conjunctival erythema   Skin: no suspicious lesions or rash. No jaundice.   CV: regular rhythm by palpation, 2+ distal pulses, no pedal edema    Resp: normal respiratory effort without conversational dyspnea   Psych: normal mood and affect    Gait: ***antalgic, appropriate coordination and balance   Neuro: normal light touch sensory exam of the extremities. Motor strength as noted below     {RIGHT/LEFT:168192::\"Bilateral\"} hip exam    Inspection:   {hip inspection:714643::\"     no edema or ecchymosis in hip area\"}    ROM:   {FSOC hip ROM:237461::\"    Full active and passive ROM \"}    Strength:   {hip strength:889203}    Tender:   {hip Tenderness:944428}    Non Tender:   {hip non Tenderness:220929::\"     remainder of hip area\"}    Sensation:   {hip sensation:419258::\"     grossly intact in hip and thigh\"}    Skin:  {skin tests:562549}    Special Tests:   {hip special tests:246026}    Radiology  ***    Assessment:  No diagnosis " "found.    Plan:  Discussed the assessment with the patient.  {[place] Plan:574691::\"Follow up: ***\"}        Disclaimer: This note consists of symbols derived from keyboarding, dictation and/or voice recognition software. As a result, there may be errors in the script that have gone undetected. Please consider this when interpreting information found in this chart.  " strategies  Could consider trial of US guided CSI in the future, defer for now  TPI could be considered as well, dry needling with PT could be equally helpful  Home handouts provided and supportive care reviewed  All questions were answered today  Contact us with additional questions or concerns  Signs and sx of concern reviewed      Patrice Jamil DO, HINA  Sports Medicine Physician  [facility]          Disclaimer: This note consists of symbols derived from keyboarding, dictation and/or voice recognition software. As a result, there may be errors in the script that have gone undetected. Please consider this when interpreting information found in this chart.

## 2025-06-30 ASSESSMENT — ACTIVITIES OF DAILY LIVING (ADL)
WALKING_APPROXIMATELY_10_MINUTES: MODERATE DIFFICULTY
GOING UP 1 FLIGHT OF STAIRS: MODERATE DIFFICULTY
GOING DOWN 1 FLIGHT OF STAIRS: MODERATE DIFFICULTY
RECREATIONAL ACTIVITIES: MODERATE DIFFICULTY
STANDING_FOR_15_MINUTES: SLIGHT DIFFICULTY
SPORTS_SCORE(%): 0
ADL_HIGHEST_POTENTIAL_SCORE: 68
WALKING_15_MINUTES_OR_GREATER: MODERATE DIFFICULTY
SPORTS_COUNT: 9
PUTTING_ON_SOCKS_AND_SHOES: SLIGHT DIFFICULTY
HOW_WOULD_YOU_RATE_YOUR_CURRENT_LEVEL_OF_FUNCTION_DURING_YOUR_USUAL_ACTIVITIES_OF_DAILY_LIVING_FROM_0_TO_100_WITH_100_BEING_YOUR_LEVEL_OF_FUNCTION_PRIOR_TO_YOUR_HIP_PROBLEM_AND_0_BEING_THE_INABILITY_TO_PERFORM_ANY_OF_YOUR_USUAL_DAILY_ACTIVITIES?: 50
GETTING INTO AND OUT OF AN AVERAGE CAR: NO DIFFICULTY AT ALL
ROLLING OVER IN BED: SLIGHT DIFFICULTY
DEEP_SQUATTING: UNABLE TO DO
HOS_ADL_HIGHEST_POTENTIAL_SCORE: 68
GOING_DOWN_1_FLIGHT_OF_STAIRS: MODERATE DIFFICULTY
PUTTING ON SOCKS AND SHOES: SLIGHT DIFFICULTY
GETTING_INTO_AND_OUT_OF_AN_AVERAGE_CAR: NO DIFFICULTY AT ALL
GETTING_INTO_AND_OUT_OF_A_BATHTUB: NO DIFFICULTY AT ALL
SPORTS_HIGHEST_POTENTIAL_SCORE: 36
GETTING_INTO_AND_OUT_OF_A_BATHTUB: NO DIFFICULTY AT ALL
GOING_UP_1_FLIGHT_OF_STAIRS: MODERATE DIFFICULTY
LOW_IMPACT_ACTIVITIES_LIKE_FAST_WALKING: MODERATE DIFFICULTY
STEPPING_UP_AND_DOWN_CURBS: NO DIFFICULTY AT ALL
HEAVY_WORK: MODERATE DIFFICULTY
SPORTS_TOTAL_ITEM_SCORE: 0
ROLLING_OVER_IN_BED: SLIGHT DIFFICULTY
LANDING: SLIGHT DIFFICULTY
WALKING_UP_STEEP_HILLS: SLIGHT DIFFICULTY
SITTING_FOR_15_MINUTES: SLIGHT DIFFICULTY
WALKING_INITIALLY: SLIGHT DIFFICULTY
ADL_SCORE(%): 0
RECREATIONAL_ACTIVITIES: MODERATE DIFFICULTY
ABILITY_TO_PERFORM_ACTIVITY_WITH_YOUR_NORMAL_TECHNIQUE: SLIGHT DIFFICULTY
DEEP SQUATTING: UNABLE TO DO
ADL_TOTAL_ITEM_SCORE: 0
WALKING_15_MINUTES_OR_GREATER: MODERATE DIFFICULTY
PLEASE_INDICATE_YOR_PRIMARY_REASON_FOR_REFERRAL_TO_THERAPY:: HIP
HOS_ADL_SCORE(%): 67.65
STANDING FOR 15 MINUTES: SLIGHT DIFFICULTY
ADL_COUNT: 17
HEAVY_WORK: MODERATE DIFFICULTY
HOS_ADL_ITEM_SCORE_TOTAL: 46
WALKING_UP_STEEP_HILLS: SLIGHT DIFFICULTY
WALKING_DOWN_STEEP_HILLS: SLIGHT DIFFICULTY
TWISTING/PIVOTING_ON_INVOLVED_LEG: NO DIFFICULTY AT ALL
WALKING_DOWN_STEEP_HILLS: SLIGHT DIFFICULTY
SITTING FOR 15 MINUTES: SLIGHT DIFFICULTY
TWISTING/PIVOTING ON INVOLVED LEG: NO DIFFICULTY AT ALL
STEPPING UP AND DOWN CURBS: NO DIFFICULTY AT ALL
LIGHT_TO_MODERATE_WORK: SLIGHT DIFFICULTY
LIGHT_TO_MODERATE_WORK: SLIGHT DIFFICULTY
HOW_WOULD_YOU_RATE_YOUR_CURRENT_LEVEL_OF_FUNCTION_DURING_YOUR_USUAL_ACTIVITIES_OF_DAILY_LIVING_FROM_0_TO_100_WITH_100_BEING_YOUR_LEVEL_OF_FUNCTION_PRIOR_TO_YOUR_HIP_PROBLEM_AND_0_BEING_THE_INABILITY_TO_PERFORM_ANY_OF_YOUR_USUAL_DAILY_ACTIVITIES?: 50
WALKING_FOR_APPROXIMATELY_10_MINUTES: MODERATE DIFFICULTY
JUMPING: SLIGHT DIFFICULTY
STARTING_AND_STOPPING_QUICKLY: SLIGHT DIFFICULTY
WALKING_INITIALLY: SLIGHT DIFFICULTY

## 2025-07-01 ENCOUNTER — THERAPY VISIT (OUTPATIENT)
Dept: PHYSICAL THERAPY | Facility: CLINIC | Age: 60
End: 2025-07-01
Payer: COMMERCIAL

## 2025-07-01 DIAGNOSIS — M79.18 MYALGIA, OTHER SITE: ICD-10-CM

## 2025-07-01 DIAGNOSIS — M95.5 PELVIC OBLIQUITY: ICD-10-CM

## 2025-07-01 DIAGNOSIS — M70.61 TROCHANTERIC BURSITIS OF RIGHT HIP: ICD-10-CM

## 2025-07-01 DIAGNOSIS — M79.18 RIGHT BUTTOCK PAIN: ICD-10-CM

## 2025-07-01 DIAGNOSIS — M76.01 GLUTEAL TENDINITIS OF RIGHT BUTTOCK: ICD-10-CM

## 2025-07-01 DIAGNOSIS — G57.01 PIRIFORMIS SYNDROME OF RIGHT SIDE: ICD-10-CM

## 2025-07-01 PROCEDURE — 97110 THERAPEUTIC EXERCISES: CPT | Mod: GP | Performed by: PHYSICAL THERAPIST

## 2025-07-01 PROCEDURE — 97161 PT EVAL LOW COMPLEX 20 MIN: CPT | Mod: GP | Performed by: PHYSICAL THERAPIST

## 2025-07-01 NOTE — PROGRESS NOTES
"PHYSICAL THERAPY EVALUATION  Type of Visit: Evaluation       Fall Risk Screen:  Have you fallen 2 or more times in the past year?: No  Have you fallen and had an injury in the past year?: No    Subjective         Presenting condition or subjective complaint: Right side Sciatica-Piriformis issues  Date of onset: 27 (date of order)    Relevant medical history:     Dates & types of surgery: Laparoscopy, appendectomy,  (all 31+ years ago)    Prior diagnostic imaging/testing results: X-ray     Prior therapy history for the same diagnosis, illness or injury: Yes Long time ago, recurring problem    Prior Level of Function  Transfers: Independent  Ambulation: Independent  ADL: Independent  IADL: Driving, Finances, Housekeeping, Laundry, Meal preparation, Work, Yard work    Living Environment  Social support: With a significant other or spouse   Type of home: House   Stairs to enter the home: Yes 3 Is there a railing: No     Ramp: No   Stairs inside the home: Yes 12 Is there a railing: No     Help at home: None  Equipment owned:       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  Pain located over right deep posterior & lateral hip with radiation to lateral thigh & lower leg.  Reports constant radiating aching pain, \"heaviness\" to lateral thigh & lower leg.  Additional Features:  Positive: numbness and weakness.  Symptoms are better with sitting, pressure point therapy, stretching.  Symptoms are worse with: standing/walking > 5 minutes, lying on right side, lifting/bending.  Other evaluation and/or treatments so far consists of: Ice, Heat, Ibuprofen, Rest, self pressure point, HEP.  Recent imaging completed: No recent imaging completed.  Prior History of related " problems: history of chronic buttocks pain - previously completed physical therapy in 2017.     Today 7/1/25; Pt presents today for acute on chronic right hip and buttock pain intensifying over the past 3-4 months. Onset initially more than 8 years ago. Did have some quite persistent numbness when it initially occurred but is now more intermittent in presentation. Goals are to be able to be more active without the increase in hip pain so she is able to lose more weight. Does occasionally travel into her lower back with prolonged walking. Does have some symptoms into the top of the foot intermittently.  Has done self trigger point release with tennis ball and that does help for a period of time. Does repoert some difficulty with sleeping as she is a side sleeper. Prolonged sitting is the worst for pain levels.     Pain assessment: Pain present     Objective   HIP EVALUATION  PAIN: Pain Level at Rest: 4/10  Pain Level with Use: 7/10  Pain Location: lumbar spine, hip, knee, ankle, and foot   Pain Quality: Aching, Dull, Numb, and Tingling  Pain Frequency: constant  Pain is Worst: daytime  Pain is Exacerbated By: prolonged sitting, prolonged walking, certain positions.   Pain is Relieved By: cold, heat, rest, and topicals  Pain Progression: Worsened  INTEGUMENTARY (edema, incisions): WNL  POSTURE: Standing Posture: Lordosis decreased, Lateral shift  GAIT:   Weightbearing Status: WBAT  Assistive Device(s): None  Gait Deviations: WFL  BALANCE/PROPRIOCEPTION: WFL  WEIGHTBEARING ALIGNMENT: Lumbar/Pelvic WB Alignment:Anterior pelvic tilt, Lateral shift L  NON-WEIGHTBEARING ALIGNMENT: WFL   ROM: AROM WNL  Limited ER on RLE    PELVIC/SI SCREEN: WNL  STRENGTH: 3+/5 hip abduction, 4-/5 hip ER. 4/5 hip extension on RLE  LE FLEXIBILITY: Decreased hip ER R  SPECIAL TESTS:    Left Right   DAT     FADIR/Labrum/OMKAR     Femoral Nerve     Cortney's     Piriformis     Quadrant Testing     SLR  Positive   Slump  Positive   Stork with  Extension     Luan            FUNCTIONAL TESTS:   PALPATION:   + Tenderness At Location Left Right   Ischial Tuberosity     Greater Trochanter  +   IT Band     Hip Flexors     Piriformis  +   PSIS     ASIS     Adductors     Abductors     Iliac Crest     Glut Medius  +   Bursa  +   Pubis       JOINT MOBILITY: WFL    Assessment & Plan   CLINICAL IMPRESSIONS  Medical Diagnosis: M79.18 (ICD-10-CM) - Right buttock pain M76.01 (ICD-10-CM) - Gluteal tendinitis of right buttock M79.18 (ICD-10-CM) - Myalgia, other site M70.61 (ICD-10-CM) - Trochanteric bursitis of right hip M95.5 (ICD-10-CM) - Pelvic obliquity G57.01 (ICD-10-CM) - Piriformis syndrome of right side    Treatment Diagnosis: Right hip and buttock pain, acute on chronic   Impression/Assessment: Patient is a 59 year old female with Right hip and buttock complaints.  The following significant findings have been identified: Pain, Decreased ROM/flexibility, Decreased strength, Impaired sensation, Impaired muscle performance, Decreased activity tolerance, and Impaired posture. These impairments interfere with their ability to perform self care tasks, work tasks, recreational activities, household chores, and driving  as compared to previous level of function.     Clinical Decision Making (Complexity):  Clinical Presentation: Stable/Uncomplicated  Clinical Presentation Rationale: based on medical and personal factors listed in PT evaluation  Clinical Decision Making (Complexity): Low complexity    PLAN OF CARE  Treatment Interventions:  Modalities: Cryotherapy, Cupping, Dry Needling, E-stim, Hot Pack, Ultrasound  Interventions: Gait Training, Manual Therapy, Neuromuscular Re-education, Therapeutic Activity, Therapeutic Exercise, Self-Care/Home Management    Long Term Goals     PT Goal 1  Goal Identifier: goal 1  Goal Description: improved ambulatory tolerance for up to 30 minutes without increased pain  Rationale: to maximize safety and independence with performance  of ADLs and functional tasks;to maximize safety and independence within the home;to maximize safety and independence within the community;to maximize safety and independence with transportation;to maximize safety and independence with self cares  Goal Progress: 10-15 min  Target Date: 08/12/25  PT Goal 2  Goal Identifier: goal 2  Goal Description: improved pain with prolonged sitting up to 2 hours to allow for longer car rides  Rationale: to maximize safety and independence with performance of ADLs and functional tasks;to maximize safety and independence within the home;to maximize safety and independence within the community;to maximize safety and independence with transportation;to maximize safety and independence with self cares  Goal Progress: 30-60 min  Target Date: 08/26/25      Frequency of Treatment: 1x/week for 4-6 weeks hen bi weekly  Duration of Treatment: 8-10 weeks    Recommended Referrals to Other Professionals:   Education Assessment:   Learner/Method: Patient;Demonstration;Pictures/Video;No Barriers to Learning    Risks and benefits of evaluation/treatment have been explained.   Patient/Family/caregiver agrees with Plan of Care.     Evaluation Time:     PT Eval, Low Complexity Minutes (21987): 13       Signing Clinician: Soy Pop PT

## 2025-07-06 ENCOUNTER — E-VISIT (OUTPATIENT)
Dept: FAMILY MEDICINE | Facility: CLINIC | Age: 60
End: 2025-07-06
Payer: COMMERCIAL

## 2025-07-06 DIAGNOSIS — B96.89 ACUTE BACTERIAL SINUSITIS: Primary | ICD-10-CM

## 2025-07-06 DIAGNOSIS — J01.90 ACUTE BACTERIAL SINUSITIS: Primary | ICD-10-CM

## 2025-07-07 RX ORDER — GUAIFENESIN 1200 MG/1
1200 TABLET, EXTENDED RELEASE ORAL 2 TIMES DAILY
Qty: 60 TABLET | Refills: 0 | Status: SHIPPED | OUTPATIENT
Start: 2025-07-07

## 2025-07-07 RX ORDER — CEFDINIR 300 MG/1
600 CAPSULE ORAL DAILY
Qty: 14 CAPSULE | Refills: 0 | Status: SHIPPED | OUTPATIENT
Start: 2025-07-07 | End: 2025-07-14

## 2025-07-07 NOTE — PATIENT INSTRUCTIONS
Acute Sinusitis: Care Instructions  Overview     Acute sinusitis is an inflammation of the mucous membranes inside the nose and sinuses. Sinuses are the hollow spaces in your skull around the eyes and nose. Acute sinusitis often follows a cold. Acute sinusitis causes thick, discolored mucus that drains from the nose or down the back of the throat. It also can cause pain and pressure in your head and face along with a stuffy or blocked nose.  In most cases, sinusitis gets better on its own in 1 to 2 weeks. But some mild symptoms may last for several weeks. Sometimes antibiotics are needed if there is a bacterial infection.  Follow-up care is a key part of your treatment and safety. Be sure to make and go to all appointments, and call your doctor if you are having problems. It's also a good idea to know your test results and keep a list of the medicines you take.  How can you care for yourself at home?  Use saline (saltwater) nasal washes. This can help keep your nasal passages open and wash out mucus and allergens.  You can buy saline nose washes at a grocery store or drugstore. Follow the instructions on the package.  You can make your own at home. Add 1 teaspoon of non-iodized salt and 1 teaspoon of baking soda to 2 cups of distilled or boiled and cooled water. Fill a squeeze bottle or a nasal cleansing pot (such as a neti pot) with the nasal wash. Then put the tip into your nostril, and lean over the sink. With your mouth open, gently squirt the liquid. Repeat on the other side.  Try a decongestant nasal spray like oxymetazoline (Afrin). Do not use it for more than 3 days in a row. Using it for more than 3 days can make your congestion worse.  If needed, take an over-the-counter pain medicine, such as acetaminophen (Tylenol), ibuprofen (Advil, Motrin), or naproxen (Aleve). Read and follow all instructions on the label.  If the doctor prescribed antibiotics, take them as directed. Do not stop taking them just  "because you feel better. You need to take the full course of antibiotics.  Be careful when taking over-the-counter cold or flu medicines and Tylenol at the same time. Many of these medicines have acetaminophen, which is Tylenol. Read the labels to make sure that you are not taking more than the recommended dose. Too much acetaminophen (Tylenol) can be harmful.  Try a steroid nasal spray. It may help with your symptoms.  Breathe warm, moist air. You can use a steamy shower, a hot bath, or a sink filled with hot water. Avoid cold, dry air. Using a humidifier in your home may help. Follow the directions for cleaning the machine.  When should you call for help?   Call your doctor now or seek immediate medical care if:    You have new or worse swelling, redness, or pain in your face or around one or both of your eyes.     You have double vision or a change in your vision.     You have a high fever.     You have a severe headache and a stiff neck.     You have mental changes, such as feeling confused or much less alert.   Watch closely for changes in your health, and be sure to contact your doctor if:    You are not getting better as expected.   Where can you learn more?  Go to https://www.[employer].net/patiented  Enter I933 in the search box to learn more about \"Acute Sinusitis: Care Instructions.\"  Current as of: October 27, 2024  Content Version: 14.5    1747-0037 [employer].   Care instructions adapted under license by your healthcare professional. If you have questions about a medical condition or this instruction, always ask your healthcare professional. [employer] disclaims any warranty or liability for your use of this information.    Dear Lamar Zimmerman    After reviewing your responses, I've been able to diagnose you with Acute bacterial sinusitis.      Based on your responses and diagnosis, I have prescribed   Orders Placed This Encounter   Medications     guaiFENesin 1200 MG TB12     " Sig: Take 1 tablet (1,200 mg) by mouth 2 times daily.     Dispense:  60 tablet     Refill:  0     cefdinir (OMNICEF) 300 MG capsule     Sig: Take 2 capsules (600 mg) by mouth daily for 7 days.     Dispense:  14 capsule     Refill:  0     Aware of allergy    to treat your symptoms. I have sent this to your pharmacy.?     It is also important to stay well hydrated, get lots of rest and take over-the-counter decongestants,?tylenol?or ibuprofen if you?are able to?take those medications per your primary care provider to help relieve discomfort.?     It is important that you take?all of?your prescribed medication even if your symptoms are improving after a few doses.? Taking?all of?your medicine helps prevent the symptoms from returning.?     If your symptoms worsen, you develop severe headache, vomiting, high fever (>102), or are not improving in 7 days, please contact your primary care provider for an appointment or visit any of our convenient Walk-in Care or Urgent Care Centers to be seen which can be found on our website?here.?     Thanks again for choosing?us?as your health care partner,?   ?  Miya Dunbar, TOY CNP?

## 2025-08-04 ENCOUNTER — THERAPY VISIT (OUTPATIENT)
Dept: PHYSICAL THERAPY | Facility: CLINIC | Age: 60
End: 2025-08-04
Attending: FAMILY MEDICINE
Payer: COMMERCIAL

## 2025-08-04 DIAGNOSIS — M76.01 GLUTEAL TENDINITIS OF RIGHT BUTTOCK: Primary | ICD-10-CM

## 2025-08-04 PROCEDURE — 97110 THERAPEUTIC EXERCISES: CPT | Mod: GP | Performed by: PHYSICAL THERAPIST

## 2025-08-18 ENCOUNTER — THERAPY VISIT (OUTPATIENT)
Dept: PHYSICAL THERAPY | Facility: CLINIC | Age: 60
End: 2025-08-18
Payer: COMMERCIAL

## 2025-08-18 DIAGNOSIS — M76.01 GLUTEAL TENDINITIS OF RIGHT BUTTOCK: Primary | ICD-10-CM

## 2025-08-18 PROCEDURE — 97110 THERAPEUTIC EXERCISES: CPT | Mod: GP | Performed by: PHYSICAL THERAPIST
